# Patient Record
Sex: FEMALE | Race: WHITE | NOT HISPANIC OR LATINO | URBAN - METROPOLITAN AREA
[De-identification: names, ages, dates, MRNs, and addresses within clinical notes are randomized per-mention and may not be internally consistent; named-entity substitution may affect disease eponyms.]

---

## 2017-12-29 ENCOUNTER — OUTPATIENT (OUTPATIENT)
Dept: OUTPATIENT SERVICES | Facility: HOSPITAL | Age: 42
LOS: 1 days | Discharge: HOME | End: 2017-12-29

## 2017-12-29 DIAGNOSIS — E04.1 NONTOXIC SINGLE THYROID NODULE: ICD-10-CM

## 2018-01-18 ENCOUNTER — APPOINTMENT (OUTPATIENT)
Dept: OTOLARYNGOLOGY | Facility: CLINIC | Age: 43
End: 2018-01-18

## 2018-01-18 ENCOUNTER — OTHER (OUTPATIENT)
Age: 43
End: 2018-01-18

## 2018-01-18 PROBLEM — Z00.00 ENCOUNTER FOR PREVENTIVE HEALTH EXAMINATION: Status: ACTIVE | Noted: 2018-01-18

## 2018-02-26 ENCOUNTER — OTHER (OUTPATIENT)
Age: 43
End: 2018-02-26

## 2018-07-11 ENCOUNTER — OTHER (OUTPATIENT)
Age: 43
End: 2018-07-11

## 2018-08-14 ENCOUNTER — OUTPATIENT (OUTPATIENT)
Dept: OUTPATIENT SERVICES | Facility: HOSPITAL | Age: 43
LOS: 1 days | Discharge: HOME | End: 2018-08-14

## 2018-08-14 ENCOUNTER — TRANSCRIPTION ENCOUNTER (OUTPATIENT)
Age: 43
End: 2018-08-14

## 2018-08-14 DIAGNOSIS — Z01.419 ENCOUNTER FOR GYNECOLOGICAL EXAMINATION (GENERAL) (ROUTINE) WITHOUT ABNORMAL FINDINGS: ICD-10-CM

## 2018-11-01 ENCOUNTER — OTHER (OUTPATIENT)
Age: 43
End: 2018-11-01

## 2019-03-29 ENCOUNTER — OTHER (OUTPATIENT)
Age: 44
End: 2019-03-29

## 2019-05-14 ENCOUNTER — OTHER (OUTPATIENT)
Age: 44
End: 2019-05-14

## 2019-10-23 ENCOUNTER — OTHER (OUTPATIENT)
Age: 44
End: 2019-10-23

## 2019-11-18 ENCOUNTER — OUTPATIENT (OUTPATIENT)
Dept: OUTPATIENT SERVICES | Facility: HOSPITAL | Age: 44
LOS: 1 days | Discharge: HOME | End: 2019-11-18

## 2019-11-18 ENCOUNTER — RESULT REVIEW (OUTPATIENT)
Age: 44
End: 2019-11-18

## 2019-11-20 DIAGNOSIS — Z12.4 ENCOUNTER FOR SCREENING FOR MALIGNANT NEOPLASM OF CERVIX: ICD-10-CM

## 2019-12-20 ENCOUNTER — OTHER (OUTPATIENT)
Age: 44
End: 2019-12-20

## 2019-12-27 ENCOUNTER — OUTPATIENT (OUTPATIENT)
Dept: OUTPATIENT SERVICES | Facility: HOSPITAL | Age: 44
LOS: 1 days | Discharge: HOME | End: 2019-12-27
Payer: MEDICAID

## 2019-12-27 DIAGNOSIS — Z12.31 ENCOUNTER FOR SCREENING MAMMOGRAM FOR MALIGNANT NEOPLASM OF BREAST: ICD-10-CM

## 2019-12-27 PROCEDURE — 77067 SCR MAMMO BI INCL CAD: CPT | Mod: 26

## 2019-12-27 PROCEDURE — 77063 BREAST TOMOSYNTHESIS BI: CPT | Mod: 26

## 2020-01-07 ENCOUNTER — LABORATORY RESULT (OUTPATIENT)
Age: 45
End: 2020-01-07

## 2020-01-07 ENCOUNTER — APPOINTMENT (OUTPATIENT)
Dept: DERMATOLOGY | Facility: CLINIC | Age: 45
End: 2020-01-07
Payer: COMMERCIAL

## 2020-01-07 ENCOUNTER — OUTPATIENT (OUTPATIENT)
Dept: OUTPATIENT SERVICES | Facility: HOSPITAL | Age: 45
LOS: 1 days | Discharge: HOME | End: 2020-01-07

## 2020-01-07 VITALS — BODY MASS INDEX: 27.82 KG/M2 | HEIGHT: 63 IN | WEIGHT: 157 LBS

## 2020-01-07 DIAGNOSIS — Z80.8 FAMILY HISTORY OF MALIGNANT NEOPLASM OF OTHER ORGANS OR SYSTEMS: ICD-10-CM

## 2020-01-07 DIAGNOSIS — R68.89 OTHER GENERAL SYMPTOMS AND SIGNS: ICD-10-CM

## 2020-01-07 PROCEDURE — 11102 TANGNTL BX SKIN SINGLE LES: CPT

## 2020-01-07 PROCEDURE — 99201 OFFICE OUTPATIENT NEW 10 MINUTES: CPT | Mod: 57,GC

## 2020-01-07 RX ORDER — LEVOTHYROXINE SODIUM 0.1 MG/1
100 TABLET ORAL DAILY
Qty: 30 | Refills: 1 | Status: DISCONTINUED | COMMUNITY
Start: 2018-01-18 | End: 2020-01-07

## 2020-01-07 RX ORDER — THYROID, PORCINE 60 MG/1
60 TABLET ORAL DAILY
Qty: 30 | Refills: 2 | Status: DISCONTINUED | COMMUNITY
Start: 2019-03-29 | End: 2020-01-07

## 2020-01-07 RX ORDER — METRONIDAZOLE 500 MG/1
500 TABLET ORAL TWICE DAILY
Qty: 28 | Refills: 0 | Status: DISCONTINUED | COMMUNITY
Start: 2018-07-11 | End: 2020-01-07

## 2020-01-07 NOTE — HISTORY OF PRESENT ILLNESS
[de-identified] : 45 yo w/ PMH of asthma, cervical cancer s/p LEEP, hypothyroidism who presents w/ 3 month hx of papule on nose who\par states she felt the lump and tried to pop it though it did not have any drainage. It had some mild bleeding and states "it healed\par funny." She comes to the clinic concerned about the papule due to hx of basal cell cancer in her father and melanoma in her mother.\par \par Denies pain, itching, discharge, fevers, chills, and weight loss.\par No precipitating factors, no alleviating factors.\par She tried bacitracin on the papule though it did not help.\par Denies makeup use and recent antibiotic use.

## 2020-01-07 NOTE — ASSESSMENT
[FreeTextEntry1] : 45 yo w/ PMH of asthma, cervical cancer s/p LEEP, hypothyroidism who presents w/ 3 month hx of papule on nose.\par \par # Hypothyroidism\par - C/w levothyroxine\par \par # Asthma\par - C/w ventolin PRN\par \par # Papule on R side of nose\par - Hx of basal cell cancer in father and melanoma in mother\par - Will perform superficial skin biopsy of nose under local anesthesia\par - Possibly angiofibroma\par - Cannot r/o basal cell carcinoma\par - F/u next week for pathology results\par \par

## 2020-01-07 NOTE — PHYSICAL EXAM
[Alert] : alert [Oriented x 3] : ~L oriented x 3 [Hair] : Hair [No Visual Lymphadenopathy] : no visual  lymphadenopathy [Scalp] : Scalp [Face] : Face [Eyelids] : Eyelids [Ears] : Ears [Lips] : Lips [Neck] : Neck [R Arm] : R Arm [L Arm] : L Arm [Nodes] : Nodes [FreeTextEntry3] : ~2mm papule on right side of nose, flesh-colored

## 2020-01-07 NOTE — REVIEW OF SYSTEMS
[Headache] : headache [Fatigue] : no fatigue [Fever] : no fever [Weight loss] : no weight loss [Night Sweats] : no night sweats [Stiffness] : no stiffness [Weakness] : no weakness [Vomiting] : no vomiting [Nausea] : no nausea [Cough] : no cough [Shortness Of Breath] : no shortness of breath [Dysuria] : no dysuria [Chest Pain] : no chest pain

## 2020-01-07 NOTE — END OF VISIT
[FreeTextEntry2] : I performed biopsy.\par Small fragments of superficial skin obtained.\par It is possible that pathology will show only superficial epidermis, but there was no indication to do a deeper biopsy. [] : Resident

## 2020-01-08 ENCOUNTER — TRANSCRIPTION ENCOUNTER (OUTPATIENT)
Age: 45
End: 2020-01-08

## 2020-01-14 ENCOUNTER — APPOINTMENT (OUTPATIENT)
Dept: DERMATOLOGY | Facility: CLINIC | Age: 45
End: 2020-01-14

## 2020-04-25 ENCOUNTER — MESSAGE (OUTPATIENT)
Age: 45
End: 2020-04-25

## 2020-05-01 ENCOUNTER — APPOINTMENT (OUTPATIENT)
Dept: DISASTER EMERGENCY | Facility: HOSPITAL | Age: 45
End: 2020-05-01

## 2020-05-02 LAB
SARS-COV-2 IGG SERPL IA-ACNC: <0.1 INDEX
SARS-COV-2 IGG SERPL QL IA: NEGATIVE

## 2020-05-18 ENCOUNTER — RX RENEWAL (OUTPATIENT)
Age: 45
End: 2020-05-18

## 2020-05-18 RX ORDER — LEVOTHYROXINE SODIUM 100 UG/1
100 TABLET ORAL
Qty: 30 | Refills: 4 | Status: ACTIVE | COMMUNITY
Start: 2019-12-20 | End: 1900-01-01

## 2020-08-04 ENCOUNTER — EMERGENCY (EMERGENCY)
Facility: HOSPITAL | Age: 45
LOS: 0 days | Discharge: HOME | End: 2020-08-04
Attending: EMERGENCY MEDICINE | Admitting: EMERGENCY MEDICINE
Payer: MEDICAID

## 2020-08-04 VITALS
SYSTOLIC BLOOD PRESSURE: 190 MMHG | RESPIRATION RATE: 17 BRPM | OXYGEN SATURATION: 100 % | TEMPERATURE: 98 F | DIASTOLIC BLOOD PRESSURE: 96 MMHG | HEART RATE: 60 BPM

## 2020-08-04 DIAGNOSIS — R06.02 SHORTNESS OF BREATH: ICD-10-CM

## 2020-08-04 PROCEDURE — L9991: CPT

## 2020-08-04 NOTE — ED ADULT TRIAGE NOTE - CHIEF COMPLAINT QUOTE
Sob x 1 day, was sent in from work for evaluation, no acute distress at this time, no wheezing noted, hx of asthma, took albuterol pump this morning.

## 2020-12-16 ENCOUNTER — RX RENEWAL (OUTPATIENT)
Age: 45
End: 2020-12-16

## 2021-10-26 ENCOUNTER — OUTPATIENT (OUTPATIENT)
Dept: OUTPATIENT SERVICES | Facility: HOSPITAL | Age: 46
LOS: 1 days | Discharge: HOME | End: 2021-10-26

## 2021-10-26 ENCOUNTER — APPOINTMENT (OUTPATIENT)
Dept: DERMATOLOGY | Facility: CLINIC | Age: 46
End: 2021-10-26
Payer: COMMERCIAL

## 2021-10-26 DIAGNOSIS — R23.8 OTHER SKIN CHANGES: ICD-10-CM

## 2021-10-26 PROCEDURE — 99213 OFFICE O/P EST LOW 20 MIN: CPT | Mod: GC

## 2021-11-30 ENCOUNTER — APPOINTMENT (OUTPATIENT)
Dept: DERMATOLOGY | Facility: CLINIC | Age: 46
End: 2021-11-30

## 2021-12-23 RX ORDER — ALBUTEROL SULFATE 90 UG/1
108 (90 BASE) AEROSOL, METERED RESPIRATORY (INHALATION)
Qty: 1 | Refills: 4 | Status: ACTIVE | COMMUNITY
Start: 2018-02-26 | End: 1900-01-01

## 2022-02-09 ENCOUNTER — APPOINTMENT (OUTPATIENT)
Dept: PLASTIC SURGERY | Facility: CLINIC | Age: 47
End: 2022-02-09
Payer: COMMERCIAL

## 2022-02-09 VITALS — WEIGHT: 180 LBS | BODY MASS INDEX: 31.89 KG/M2 | HEIGHT: 63 IN

## 2022-02-09 DIAGNOSIS — Z86.018 PERSONAL HISTORY OF OTHER BENIGN NEOPLASM: ICD-10-CM

## 2022-02-09 DIAGNOSIS — D48.7 NEOPLASM OF UNCERTAIN BEHAVIOR OF OTHER SPECIFIED SITES: ICD-10-CM

## 2022-02-09 DIAGNOSIS — D48.5 NEOPLASM OF UNCERTAIN BEHAVIOR OF SKIN: ICD-10-CM

## 2022-02-09 DIAGNOSIS — Z78.9 OTHER SPECIFIED HEALTH STATUS: ICD-10-CM

## 2022-02-09 DIAGNOSIS — Z87.19 PERSONAL HISTORY OF OTHER DISEASES OF THE DIGESTIVE SYSTEM: ICD-10-CM

## 2022-02-09 PROCEDURE — 99202 OFFICE O/P NEW SF 15 MIN: CPT | Mod: 25

## 2022-02-09 PROCEDURE — 11102 TANGNTL BX SKIN SINGLE LES: CPT

## 2022-02-09 NOTE — PROCEDURE
[Nl] : None [FreeTextEntry1] : right nasal nonpigmentation skin lesion [FreeTextEntry2] : right nasal nonpigmentation skin lesion hsave biopsy [FreeTextEntry6] : Benefits, risks and alternatives of the procedure were discussed. The risks include but not limited to bleeding, infection, poor wound healing and scarring, possible keloid, and need for re-operation. Location of scar and expected post-surgical outcomes were discussed. The patient understands the risks and would like to proceed with the office surgery.  Informed consent obtained\par \par The skin lesion was marked and infiltrated with local anesthesia to effect.  The excision site was prepared and draped in sterile fashion.\par The skin lesion was excised with the indicated margins in the usual fashion and sent to pathology for review. \par Surgical wounds were made hemostatic and repaired as follows:\par \par Site: right nasal dorsum\par Skin lesion width (with margins): shave 3 mm\par Moncel's and bacitracin/bandage applied\par  [FreeTextEntry7] : right nasal skin lesion

## 2022-02-09 NOTE — ASSESSMENT
[FreeTextEntry1] : 47 yo F with right nasal skin lesion indicated for biopsy to establish diagnosis, likely benign\par \par s/p shave biopsy\par \par -pt tolerated the procedure\par -bacitracin BID\par -sun protection\par -all questions were answered\par -follow up in 2 weeks to review path and treatment plan\par \par Due to COVID-19, pre-visit patient instructions were explained to the patient and their symptoms were checked upon arrival. Masks were used by the healthcare provider and staff and the examination room was cleaned after the patient visit concluded\par

## 2022-02-09 NOTE — PHYSICAL EXAM
[de-identified] : well developed female, NAD [de-identified] : NC/AT [de-identified] : PERRL [de-identified] : supple [de-identified] : unlabored breathing, good inspiratory effort  [de-identified] : HONEYR [de-identified] : soft, nontender  [de-identified] : FROM in all 4 extremities  [de-identified] : Face - right nasal tip with 3mm round raised nodular mass, nontender; no pigmentation, keratosis or malignant characteristics

## 2022-02-09 NOTE — HISTORY OF PRESENT ILLNESS
[FreeTextEntry1] : 45 yo F with PMHx of Asthma, Lyme disease, GERD, Hypothyroidism and Cervical Ca s/p LEEP who presents today for evaluation of right nasal skin lesion. Patient reports having a small raised skin lesion for 2-3 years s/p office shave biopsy with Dr. Rasheed with unfortunate loss of specimen. \par Patient now presents c/o increasing size of the lesion. Denies any pain, pruritus or spontaneous bleeding. 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

## 2022-02-23 LAB — CORE LAB BIOPSY: NORMAL

## 2022-02-25 ENCOUNTER — APPOINTMENT (OUTPATIENT)
Dept: PLASTIC SURGERY | Facility: CLINIC | Age: 47
End: 2022-02-25

## 2022-04-14 RX ORDER — BUDESONIDE AND FORMOTEROL FUMARATE DIHYDRATE 160; 4.5 UG/1; UG/1
160-4.5 AEROSOL RESPIRATORY (INHALATION) TWICE DAILY
Qty: 1 | Refills: 3 | Status: DISCONTINUED | COMMUNITY
Start: 2022-04-14 | End: 2022-04-14

## 2022-04-14 RX ORDER — BUDESONIDE AND FORMOTEROL FUMARATE DIHYDRATE 160; 4.5 UG/1; UG/1
160-4.5 AEROSOL RESPIRATORY (INHALATION) TWICE DAILY
Qty: 1 | Refills: 3 | Status: ACTIVE | COMMUNITY
Start: 2022-04-14 | End: 1900-01-01

## 2022-04-21 RX ORDER — BUDESONIDE AND FORMOTEROL FUMARATE DIHYDRATE 160; 4.5 UG/1; UG/1
160-4.5 AEROSOL RESPIRATORY (INHALATION) TWICE DAILY
Qty: 1 | Refills: 3 | Status: ACTIVE | COMMUNITY
Start: 2022-04-21 | End: 1900-01-01

## 2022-12-27 ENCOUNTER — APPOINTMENT (OUTPATIENT)
Dept: ORTHOPEDIC SURGERY | Facility: CLINIC | Age: 47
End: 2022-12-27
Payer: COMMERCIAL

## 2022-12-27 VITALS — BODY MASS INDEX: 29.23 KG/M2 | WEIGHT: 165 LBS | HEIGHT: 63 IN

## 2022-12-27 PROCEDURE — 73630 X-RAY EXAM OF FOOT: CPT | Mod: LT

## 2022-12-27 PROCEDURE — 99203 OFFICE O/P NEW LOW 30 MIN: CPT

## 2022-12-27 NOTE — IMAGING
[de-identified] : Examination of the left foot, patient has generalized swelling, bruising on about the dorsal lateral aspect of the foot, patient has significant tenderness over the Lisfranc and over the navicular bone.\par Decreased range of motion to dorsiflexion plantarflexion possibly due to pain and swelling.\par Pain with ranging of the toes.\par Decreased motor strength with dorsiflexion plantarflexion possibly due to pain.\par Unable to apply weight.\par Neurovascular intact.\par \par X-ray of the left foot was signed in office today, this x-ray was nonweightbearing, there is a questionable fracture over the navicular, mildly displaced.\par

## 2022-12-27 NOTE — HISTORY OF PRESENT ILLNESS
[de-identified] : 47-year-old female here for evaluation of injury sustained to the left foot, patient states that this new happened yesterday, patient states that she was going down the stairs, she tripped and fell forward dorsiflexion injury to the left foot, at this time patient has significant swelling, pain and unable to apply weight over the foot.

## 2022-12-27 NOTE — DISCUSSION/SUMMARY
[de-identified] : Impression: Injury to the left foot, possible navicular fracture and Lisfranc injury.\par \par Plan: Patient was placed in a short cam walker boot, advised of nonweightbearing, patient was advised for the use of crutches or a knee scooter.\par Patient was advised for an MRI of the left foot to evaluate a possible Lisfranc injury and evaluate navicular fracture.\par Patient was advised to stay home from work.\par Patient was advised to take over-the-counter anti-inflammatories for pain.\par Patient was advised to follow-up in 2 weeks for repeat evaluation with Dr. Goss or Dr. Miramontes.\par Patient was advised to give me a call us once the MRI gets done so we can discuss results over the phone.\par \par Follow-up: 2 weeks.\par \par Supervising physician Dr. Miramontes

## 2023-01-03 ENCOUNTER — APPOINTMENT (OUTPATIENT)
Dept: MRI IMAGING | Facility: CLINIC | Age: 48
End: 2023-01-03
Payer: COMMERCIAL

## 2023-01-03 PROCEDURE — 73718 MRI LOWER EXTREMITY W/O DYE: CPT | Mod: LT

## 2023-01-06 ENCOUNTER — NON-APPOINTMENT (OUTPATIENT)
Age: 48
End: 2023-01-06

## 2023-01-13 ENCOUNTER — APPOINTMENT (OUTPATIENT)
Dept: ORTHOPEDIC SURGERY | Facility: CLINIC | Age: 48
End: 2023-01-13
Payer: COMMERCIAL

## 2023-01-13 ENCOUNTER — NON-APPOINTMENT (OUTPATIENT)
Age: 48
End: 2023-01-13

## 2023-01-13 PROCEDURE — 99213 OFFICE O/P EST LOW 20 MIN: CPT

## 2023-01-13 NOTE — ASSESSMENT
[FreeTextEntry1] : 47-year-old woman status post axial loading injury left foot resulting in a Lisfranc sprain.  Recommend 6-12 weeks nonweightbearing.  Recommend nonsteroidal anti-inflammatory medication or Tylenol for pain relief.  NSAID precautions discussed.  Will give her prescription for narrow walker to utilize within her house to navigate her house.  Will have her check in with us in 6 weeks for repeat evaluation.  Patient remains 100% disabled at a [address] to work.  Will reassess her on follow-up.

## 2023-01-13 NOTE — HISTORY OF PRESENT ILLNESS
[de-identified] :  47-year-old woman sustained a axial loading injury to her left foot slipping on stairs December 26, 2022. Initially seen in our walk-in clinic radiographs were on impressive for fracture.  Subsequently sent for an MRI the report of which is in records.  Patient was placed in a Cam boot instructed to remain nonweightbearing and given crutches.  She returns today for follow-up.  She has no prior history of foot problems.  She has chosen to live with the pain and take medication.  She has no history of fractures.  Past medical history significant for hypothyroidism.  She has pre menopausal.

## 2023-01-13 NOTE — IMAGING
[de-identified] :   Pleasant young woman sits comfortably my office no distress.\par \par Physical examination:\par Left foot and ankle:  Tenderness palpation primarily over the middle and lateral aspect of her metatarsal tarsal joints as well as tenderness along the ATFL ligament of her ankle and around the peroneal tendons of her hindfoot.  There is ecchymosis that is resolving dorsal lateral aspect of her midfoot.  Arch is adequately maintained.  Normal light sensation dorsal plantar aspect her foot.  Good capillary refill.  She is able range her ankle without undue discomfort over short arc.  There are no callosities or ulcers.\par \par Radiographs of the patient's left foot from December 27, 2022 were Re reviewed and demonstrate no evidence of fracture.  The MRI from January 3, 2023 was reviewed.  I agree with the radiology report that there is edema at the bases 2nd metatarsal which is consistent with a bony Lisfranc her ankle Lisfranc sprain.  There is also some edema laterally but clinically this does not correlate discomfort.  There is tenderness along her peroneal tendons done a do see some edema about the insertion of the peroneal tendons on the lateral aspect of the midfoot.

## 2023-01-26 RX ORDER — GABAPENTIN 300 MG/1
300 CAPSULE ORAL
Qty: 90 | Refills: 2 | Status: ACTIVE | COMMUNITY
Start: 2023-01-26 | End: 1900-01-01

## 2023-02-16 ENCOUNTER — NON-APPOINTMENT (OUTPATIENT)
Age: 48
End: 2023-02-16

## 2023-02-16 ENCOUNTER — APPOINTMENT (OUTPATIENT)
Dept: ORTHOPEDIC SURGERY | Facility: CLINIC | Age: 48
End: 2023-02-16
Payer: COMMERCIAL

## 2023-02-16 PROCEDURE — 73630 X-RAY EXAM OF FOOT: CPT | Mod: LT

## 2023-02-16 PROCEDURE — 99213 OFFICE O/P EST LOW 20 MIN: CPT

## 2023-02-16 RX ORDER — NAPROXEN 500 MG/1
500 TABLET ORAL
Qty: 60 | Refills: 2 | Status: ACTIVE | COMMUNITY
Start: 2023-02-16 | End: 1900-01-01

## 2023-02-16 NOTE — IMAGING
[de-identified] :   Pleasant early middle-aged woman sits comfortably my office no distress.  She was able get onto the exam table without assistance.  She is abide by nonweightbearing precautions.  She remains in a short Cam boot.\par \par Physical examination:\par Left foot and ankle with the boot removed:  Dysesthetic sensation to light touch over the dorsal aspect of her foot.  No undue swelling.  She also has deep tenderness to palpation over the calcaneal tuberosity.  Mild discomfort to palpation over the plantar aspect of her midfoot.  Arch is maintained.  Good capillary refill.  No callosities ulcers.  Calf soft no cords.

## 2023-02-16 NOTE — HISTORY OF PRESENT ILLNESS
[de-identified] :  47-year-old woman returns for interval follow-up of a left midfoot sprain complicated by complex regional pain syndrome.  Recently begun on gabapentin, which has significantly of from improved her symptoms.  She has remain nonweightbearing  since January 6, 2022.  She remains in a short cam boot.  She also has some complaints achy pain.  She remains out of work as result of this injury.

## 2023-02-16 NOTE — ASSESSMENT
[FreeTextEntry1] :   47-year-old woman with left midfoot sprain roughly 6 weeks ago complicated by complex regional pain syndrome.  This is partially improved with commencement a gabapentin.  Would recommend continuing with a nonsteroidal anti-inflammatory medication for the achy pain.  Were going to give her prescription for Naprosyn 500 mg 1 tablet twice daily as needed.  NSAID precautions discussed.  I would like her to start physical therapy.  Physical therapy is going to focus and initially on desensitization of the patient's foot.  After we can do sensation she can begin to work on gait training balance generalized function and strengthening of her ankle and foot.  Modalities could be utilized adjunct therapy.  I will have her follow up in my office in 6-8 weeks time for repeat evaluation.  We will reassess the patient has ability to return to work on follow-up.  In the interim she remains 100% disabled and unable to work.  During that time I would like her also to check in with the pain management team for ongoing treatment of her presumptive diagnosis of complex regional pain syndrome.  Patient agrees with the plan.  All of her questions were answered to her satisfaction.

## 2023-02-17 ENCOUNTER — APPOINTMENT (OUTPATIENT)
Dept: ORTHOPEDIC SURGERY | Facility: CLINIC | Age: 48
End: 2023-02-17

## 2023-02-24 ENCOUNTER — APPOINTMENT (OUTPATIENT)
Dept: ORTHOPEDIC SURGERY | Facility: CLINIC | Age: 48
End: 2023-02-24

## 2023-03-08 ENCOUNTER — APPOINTMENT (OUTPATIENT)
Dept: PAIN MANAGEMENT | Facility: CLINIC | Age: 48
End: 2023-03-08
Payer: COMMERCIAL

## 2023-03-08 VITALS — HEIGHT: 63 IN | BODY MASS INDEX: 31.89 KG/M2 | WEIGHT: 180 LBS

## 2023-03-08 DIAGNOSIS — Z82.49 FAMILY HISTORY OF ISCHEMIC HEART DISEASE AND OTHER DISEASES OF THE CIRCULATORY SYSTEM: ICD-10-CM

## 2023-03-08 PROCEDURE — 99204 OFFICE O/P NEW MOD 45 MIN: CPT

## 2023-03-08 NOTE — DATA REVIEWED
[MRI] : MRI [Left] : left [Foot] : foot [Report was reviewed and noted in the chart] : The report was reviewed and noted in the chart [I independently reviewed and interpreted images and report] : I independently reviewed and interpreted images and report [FreeTextEntry1] : MRI left foot (1/3/2023)\par FINDINGS: \par There is no marrow edema in the navicular bone. There is marrow edema in the anterior process \par of the calcaneus measuring 1.5 cm where a nondisplaced fracture is suspected. There is a small \par nondisplaced fracture in the plantar cuboid. There is a contusion in the plantar medial aspect of \par the medial cuneiform measuring 1 cm. There is a contusion measuring 1 cm in the dorsal \par proximal lateral cuneiform. There is a subtle contusion in the plantar base of the second \par metatarsal where there is a mild Lisfranc ligament sprain. There are tiny subchondral cysts in \par the plantar base of the first metatarsal and lateral aspect of the proximal medial cuneiform base. \par There is no malalignment. There is moderate arthrosis in the first MTP which appears chronic.\par There is no tendon tear. There is slight patchy marrow edema in the lateral distal talus \par suggesting slight contusion or stress reaction.\par \par IMPRESSION:\par 1. Nondisplaced fracture of the anterior process of the calcaneus and small nondisplaced fracture \par in the plantar distal cuboid with contusions in the medial cuneiform and lateral cuneiform.\par 2. Mild Lisfranc ligament sprain and subtle patchy marrow edema in the plantar base of the \par second metatarsal which could represent stress reaction, contusion, or small nondisplaced \par avulsion injury without Lisfranc ligament discontinuity or malalignment of the midfoot.\par 3. Slight patchy marrow edema in the lateral distal talus suggesting slight contusion or stress \par reaction.\par 4. Small subcortical cysts in the medial cuneiform and base of the first metatarsal.\par 5. Moderate chronic appearing arthrosis of the first\par MTP. \par 6. No marrow edema in the navicular bone.

## 2023-03-08 NOTE — HISTORY OF PRESENT ILLNESS
[FreeTextEntry1] : Patient is a 46 y/o woman presenting for a NPV for a history of left foot pain. She states that she stepped wrong on her left foot when walking down the stairs to her basement on 12/26/2022. She felt a pop over the dorsolateral aspect of the left foot and soon thereafter developed some pain and erythema/bruising over this region. She went to see the orthopedist the next day and underwent x-rays and then an MRI of the left foot. She did not follow initial instructions to be non weightbearing, but after a few days, she received a phone call from the office with MRI results and then complied with using a walking boot. At this point, the patient was mild and she did not require any medications for pain. However, three weeks after the initial injury, the patient suddenly developed a burning, tingling, stabbing pain over the right great toe and medial foot. She had pins and needles sensation, allodynia, and erythema. The patient called the orthopedist, who prescribed her gabapentin and told her she may have CRPS. Two days after starting gabapentin, she noticed significant improvement in pain. She then started in physical therapy and has been doing desensitization treatment there and at home and notes improvement of her symptoms. Currently, the patient endorses having allodynia over the dorsal aspect of the toes and foot. In the 1st and 3rd toes, the patient has intermittent blue discoloration. She has also had peeling, sloughing skin over the sole of the left foot. No hair growth over the left great toe. She also notes very slow toenail growth over all the toes, especially the 1st and 3rd toes. The patient states that she has had to cut the toenails on the right foot twice and has not had to cut the toenails on the left. No sweating, but the patient notes some joint stiffness over the great toe.

## 2023-03-08 NOTE — REVIEW OF SYSTEMS
[Joint Stiffness] : joint stiffness [Negative] : Endocrine [Fever] : no fever [Chills] : no chills [Sleep Disturbances] : ~T no sleep disturbances

## 2023-03-08 NOTE — PHYSICAL EXAM
[de-identified] : Gen: NAD\par Head: NC/AT\par Eyes: no glasses, no scleral icterus\par ENT: patient wearing a mask\par CV: RRR, S1 S2, no mrg\par Lungs: CTAB, nonlabored breathing\par Abd: soft, NT/ND\par Ext: LLE: +skin sloughing over the plantar aspect of the foot, +allodynia over the dorsal aspect of the foot with heightened sensitivity over the 1st and 3rd toes; +blotchy erythema throughout the dorsal left foot compared to the right and shiny appearance of the skin on the left foot; stiffness over the great toe w/ decreased ROM\par Neuro: CN intact\par Psych: normal affect\par Skin: no visible lesions\par

## 2023-03-08 NOTE — DISCUSSION/SUMMARY
[de-identified] : Patient is a 46 y/o woman presenting for a NPV for a history of left foot pain after an injury in 12/2022 who developed CRPS symptoms in January 2023. \par \par Prior treatment: gabapentin 300mg TID w/ improvement, physical therapy x1-2 months\par \par Plan:\par 1) Patient meets [place] Criteria for CRPS type I\par 2) Continue gabapentin 300mg TID\par 3) Continue physical therapy with focus on desensitization\par 4) Discussed a trial of LEFT lumbar sympathetic blocks; will defer at this time, as symptoms are improving\par 5) RTC 6 weeks

## 2023-03-30 ENCOUNTER — APPOINTMENT (OUTPATIENT)
Dept: ORTHOPEDIC SURGERY | Facility: CLINIC | Age: 48
End: 2023-03-30
Payer: COMMERCIAL

## 2023-03-30 PROCEDURE — 99213 OFFICE O/P EST LOW 20 MIN: CPT

## 2023-03-30 NOTE — ASSESSMENT
[FreeTextEntry1] : 48-year-old woman midfoot sprain and ankle sprain complicated by complex regional pain syndrome seems to be responding to modalities of desensitization and gabapentin.  I am going to renew her prescription for gabapentin for the next month and then going forward she will get prescriptions from Pain Management.  Were going to renew her physical therapy which will continue with the desensitization but can now work on ankle and toe range of motion ankle strengthening exercises gait training balance and generalized conditioning.  She can weight bear as tolerated.  I would recommend starting weight-bearing in the Cam boot and then as she becomes more facile transitioning to supportive shoe wear with a rigid sole.  I would have her check in with me in 4-6 weeks time for repeat evaluation with repeat weight-bearing radiographs of her foot.  She has any problems she is welcome back sooner.  All questions were answered to her satisfaction and she agrees with the plan.  Patient is going to call the office when she is ready to return to work otherwise will anticipate that she is out of work until follow-up.

## 2023-03-30 NOTE — IMAGING
[de-identified] : Daina young woman sits comfortably in my office in no distress.\par \par Physical examination:\par Left foot ankle:  Sensitivity to light touch is significantly improved.  There is no swelling about her foot.  Arch is maintained in midfoot remains relatively supple.  She has no focal bony tenderness about her midfoot her ankle.  Some mild tenderness along the dorsal aspect of her midfoot as well as along the ATFL ligaments.  Calf is soft no cords.  She is able actively dorsiflex and plantar flex over short arc without discomfort.  She still remains with difficulties inverting any vertigo her foot.  Good capillary refill.\par \par

## 2023-03-30 NOTE — HISTORY OF PRESENT ILLNESS
[de-identified] :  48-year-old woman returns for interval follow-up of a left midfoot and probably ankle sprain sustained as result of a twisting injury January 6, 2023.  Her injuries been complicated by complex regional pain syndrome for which she has been started on desensitization therapy and gabapentin.  These modalities have significantly improved her sensitivity to discomfort.  For the most part she has abide by nonweightbearing precautions.  She is eager to advance her functional status at albeit with some trepidation because of discomfort.  Denies any new trauma.

## 2023-04-13 ENCOUNTER — NON-APPOINTMENT (OUTPATIENT)
Age: 48
End: 2023-04-13

## 2023-04-20 ENCOUNTER — APPOINTMENT (OUTPATIENT)
Dept: PAIN MANAGEMENT | Facility: CLINIC | Age: 48
End: 2023-04-20
Payer: COMMERCIAL

## 2023-04-20 VITALS
HEIGHT: 63 IN | BODY MASS INDEX: 31.89 KG/M2 | WEIGHT: 180 LBS | DIASTOLIC BLOOD PRESSURE: 104 MMHG | SYSTOLIC BLOOD PRESSURE: 141 MMHG | HEART RATE: 77 BPM

## 2023-04-20 PROCEDURE — 99214 OFFICE O/P EST MOD 30 MIN: CPT

## 2023-04-20 NOTE — HISTORY OF PRESENT ILLNESS
[FreeTextEntry1] : Patient is a 47 y/o woman presenting for a RPV for a history of CRPS type I of the left foot after an injury. The patient has started weight bearing on the LLE 3 weeks ago and notes ongoing discomfort, burning, and numbness over the left great toe and other toes and entire foot. She continues to have intermittent purple discoloration in the area as well as stiffness in the toes. The patient also endorses ongoing skin sloughing in the foot. She also continues to have allodynia over the toes, particularly the great toe. The patient continues to take gabapentin 300mg TID. She does not have significant side effects with this medication.

## 2023-04-20 NOTE — DISCUSSION/SUMMARY
[de-identified] : Patient is a 49 y/o woman presenting for a RPV for a history of CRPS type I of the left foot after an injury.\par \par Plan:\par 1) Increase gabapentin to 600mg TID\par 2) Continue physical therapy/desensitization\par 3) Consider LEFT LSB\par 4) RTC 4 weeks

## 2023-04-20 NOTE — PHYSICAL EXAM
[de-identified] : Gen: NAD\par HEENT: NC/AT, no scleral icterus\par CV: no JVD\par Resp: nonlabored breathing\par Abd: nondistended\par Ext: LLE: +allodynia over all of the toes, particularly the great toe; no temperature or color abnormality, apparent stiffness with movement of the toes, +some skin dryness and flaking throughout the foot\par Neuro: CN intact\par Psych: normal affect\par

## 2023-04-21 ENCOUNTER — OUTPATIENT (OUTPATIENT)
Dept: OUTPATIENT SERVICES | Facility: HOSPITAL | Age: 48
LOS: 1 days | End: 2023-04-21
Payer: COMMERCIAL

## 2023-04-21 DIAGNOSIS — Z12.31 ENCOUNTER FOR SCREENING MAMMOGRAM FOR MALIGNANT NEOPLASM OF BREAST: ICD-10-CM

## 2023-04-21 DIAGNOSIS — R92.2 INCONCLUSIVE MAMMOGRAM: ICD-10-CM

## 2023-04-21 PROCEDURE — 77063 BREAST TOMOSYNTHESIS BI: CPT | Mod: 26

## 2023-04-21 PROCEDURE — 77063 BREAST TOMOSYNTHESIS BI: CPT

## 2023-04-21 PROCEDURE — 77067 SCR MAMMO BI INCL CAD: CPT

## 2023-04-21 PROCEDURE — 76641 ULTRASOUND BREAST COMPLETE: CPT | Mod: 26,50

## 2023-04-21 PROCEDURE — 77067 SCR MAMMO BI INCL CAD: CPT | Mod: 26

## 2023-04-21 PROCEDURE — 76641 ULTRASOUND BREAST COMPLETE: CPT | Mod: 50

## 2023-04-22 DIAGNOSIS — Z12.31 ENCOUNTER FOR SCREENING MAMMOGRAM FOR MALIGNANT NEOPLASM OF BREAST: ICD-10-CM

## 2023-04-22 DIAGNOSIS — R92.2 INCONCLUSIVE MAMMOGRAM: ICD-10-CM

## 2023-04-28 ENCOUNTER — APPOINTMENT (OUTPATIENT)
Dept: GASTROENTEROLOGY | Facility: CLINIC | Age: 48
End: 2023-04-28
Payer: COMMERCIAL

## 2023-04-28 DIAGNOSIS — Z86.39 PERSONAL HISTORY OF OTHER ENDOCRINE, NUTRITIONAL AND METABOLIC DISEASE: ICD-10-CM

## 2023-04-28 DIAGNOSIS — Z12.11 ENCOUNTER FOR SCREENING FOR MALIGNANT NEOPLASM OF COLON: ICD-10-CM

## 2023-04-28 DIAGNOSIS — Z87.09 PERSONAL HISTORY OF OTHER DISEASES OF THE RESPIRATORY SYSTEM: ICD-10-CM

## 2023-04-28 PROCEDURE — 99212 OFFICE O/P EST SF 10 MIN: CPT | Mod: 95

## 2023-04-28 RX ORDER — POLYETHYLENE GLYCOL 3350 AND ELECTROLYTES WITH LEMON FLAVOR 236; 22.74; 6.74; 5.86; 2.97 G/4L; G/4L; G/4L; G/4L; G/4L
236 POWDER, FOR SOLUTION ORAL
Qty: 1 | Refills: 0 | Status: ACTIVE | COMMUNITY
Start: 2023-04-28 | End: 1900-01-01

## 2023-04-28 RX ORDER — OMEPRAZOLE 40 MG/1
40 CAPSULE, DELAYED RELEASE ORAL
Qty: 30 | Refills: 2 | Status: DISCONTINUED | COMMUNITY
Start: 2021-11-15 | End: 2023-04-28

## 2023-04-28 RX ORDER — BISACODYL 5 MG/1
5 TABLET ORAL
Qty: 4 | Refills: 0 | Status: ACTIVE | COMMUNITY
Start: 2023-04-28 | End: 1900-01-01

## 2023-04-28 NOTE — REVIEW OF SYSTEMS
[Fever] : no fever [Chills] : no chills [Feeling Tired] : not feeling tired [Recent Weight Loss (___ Lbs)] : no recent weight loss [Chest Pain] : no chest pain [Palpitations] : no palpitations [Lower Ext Edema (lower leg swelling)] : no lower extremity edema [Cough] : no cough [SOB on Exertion] : no shortness of breath during exertion [Abdominal Pain] : no abdominal pain [Vomiting] : no vomiting [Constipation] : no constipation [Diarrhea] : no diarrhea [Heartburn] : no heartburn [Melena (black stool)] : no melena [FreeTextEntry7] : occasional rectal bleeding

## 2023-04-28 NOTE — ASSESSMENT
[FreeTextEntry1] : 47yo female h/o asthma, hypothyroidism presents for evaluation for initial screening colonoscopy. Occasional rectal bleeding otherwise no GI complaints.\par \par -Recommend obtain outside labs from PMD office\par -Recommend colonoscopy to further evaluate and as pt due for screening purposes\par -Discussed risks/benefits with pt in detail including but not limited to bleeding, infection, perforation, side effects of medications/sedation. Pt verbalized understanding and wants to proceed with colonoscopy.\par \par \par Follow up after testing\par Pt agreeable with plan, advised to contact us if questions/concerns in the interim\par

## 2023-04-28 NOTE — HISTORY OF PRESENT ILLNESS
[FreeTextEntry4] : KRIS CLEARY [FreeTextEntry1] : 47yo female h/o asthma, hypothyroidism presents for evaluation for initial screening colonoscopy\par \par Pt reports feeling well overall. Denies abdominal pain or nausea/vomiting. Reported episodes of heartburn few years ago thinks she was heavier weight at that time, took omeprazole briefly then stopped. Denies recent heartburn or dysphagia. Reports regular formed bm daily, occasional loose stools when stressed, reports occasional bright blood dripping in toilet and when wiping, denies melena. Appetite good, denies weight loss.\par States asthma is well controlled, denies prior intubation or problems with anesthesia.\par \par No prior colonoscopy\par No known family h/o colon ca\par \par No recent labs available for review, states she had done 1 month ago at PMD office \par

## 2023-05-05 ENCOUNTER — APPOINTMENT (OUTPATIENT)
Dept: ORTHOPEDIC SURGERY | Facility: CLINIC | Age: 48
End: 2023-05-05
Payer: COMMERCIAL

## 2023-05-05 PROCEDURE — 99213 OFFICE O/P EST LOW 20 MIN: CPT

## 2023-05-05 PROCEDURE — 73630 X-RAY EXAM OF FOOT: CPT | Mod: LT

## 2023-05-05 NOTE — ASSESSMENT
[FreeTextEntry1] :   48-year-old woman left midfoot sprain complicated by regional pain syndrome.  Defer to pain management regarding the CRPS.  Agree with continued use a gabapentin.  Recommend NSAID.  Will give a prescription for meloxicam.  NSAID precautions discussed.  Continue with PT.  PT to continue to work on desensitization ankle motion and strengthening with modalities utilized adjunct therapy.  Focus of therapy to teach the patient a self-directed exercise program.  Follow up in my office in 3-6 months.  Worsening symptoms of pain or problems welcome back sooner.  All questions answered to her satisfaction.Follow-up if she still having midfoot pain will consider bilateral weight-bearing foot x-rays.

## 2023-05-05 NOTE — IMAGING
[de-identified] :   Pleasant middle-aged woman sits comfortably my office no distress.  She walks in with some antalgia.\par \par Physical examination:\par Left foot:  Arch is maintained.  No undue swelling.  Dysesthetic sensation over the dorsal and to a lesser extent plantar aspect of her forefoot and lateral midfoot.  Really has no tenderness palpation over the medial metatarsal tarsal joints.  She is able range her ankle and toes without significant limitation.  There is no undue swelling about her foot.  There is no erythema or shiny discoloration to her foot consistent with a hot phase is complex regional pain syndrome.  good capillary refill with 2+ DP pulses.\par \par   Radiographs: \par Bilateral weight-bearing feet (AP, lateral, oblique):  Really no evidence of a Lisfranc disruption.  There is slight flattening of navicular cuneiform joints on the lateral radiograph.  Otherwise unremarkable examination.

## 2023-05-05 NOTE — HISTORY OF PRESENT ILLNESS
[de-identified] :  48-year-old woman returns interval follow-up left midfoot sprain sustained as result twisting injury during 06/2023.  Her recovery has been complicated by complex regional pain syndrome for which are refer to pain management.  She is currently on gabapentin 300 mg 3 times a day doubling the evening dose.  This is helped significantly with her discomfort.  She is currently enrolled in physical therapy.  She does have some achy pain from time to time mostly dysesthetic in nature.  Finds massaging lateral aspect her foot helpful.  Mostly the achy pain is around her intermetatarsal ligaments and distal metatarsophalangeal joints.  She does not really localize the achy pain to her medial midfoot.  She denies any callosities or ulcers.  She has been not taking any other medications for discomfort such as NSAIDs or Tylenol.

## 2023-05-17 ENCOUNTER — APPOINTMENT (OUTPATIENT)
Dept: PAIN MANAGEMENT | Facility: CLINIC | Age: 48
End: 2023-05-17
Payer: COMMERCIAL

## 2023-05-17 VITALS — WEIGHT: 180 LBS | BODY MASS INDEX: 31.89 KG/M2 | HEIGHT: 63 IN

## 2023-05-17 PROCEDURE — 99214 OFFICE O/P EST MOD 30 MIN: CPT

## 2023-05-17 NOTE — HISTORY OF PRESENT ILLNESS
[FreeTextEntry1] : Patient is a 47 y/o woman presenting for a RPV for a history of CRPS type I in the left foot. The patient states that, since her last visit, she has had improvement of pain. She has increased her gabapentin to 300/600/600mg daily and feels like this has kept her pain well controlled. She has been able to return to work and is able to wear a normal shoe on the left foot. The patient has continued with physical therapy and has almost completed a full 8 week course. She no longer feels tingling/numbness/burning that she had been having in the right foot previously.

## 2023-05-17 NOTE — DISCUSSION/SUMMARY
[de-identified] : Patient is a 47 y/o woman presenting for a RPV for a history of CRPS type I of the left foot after an injury.\par \par Plan:\par 1) Continue gabapentin 300/600/600mg daily\par 2) Patient may stop physical therapy; can restart in the future if needed\par 3) Consider LEFT LSB\par 4) RTC 6 weeks with Joan

## 2023-05-17 NOTE — PHYSICAL EXAM
[de-identified] : Gen: NAD\par HEENT: NC/AT, no scleral icterus\par CV: no JVD\par Resp: nonlabored breathing\par Abd: nondistended\par Ext: LLE: +allodynia over all of the toes, particularly the great toe; no temperature or color abnormality, apparent stiffness with movement of the toes, +some skin dryness and flaking throughout the foot\par Neuro: CN intact\par Psych: normal affect

## 2023-06-27 ENCOUNTER — TRANSCRIPTION ENCOUNTER (OUTPATIENT)
Age: 48
End: 2023-06-27

## 2023-06-27 ENCOUNTER — RESULT REVIEW (OUTPATIENT)
Age: 48
End: 2023-06-27

## 2023-06-27 ENCOUNTER — OUTPATIENT (OUTPATIENT)
Dept: INPATIENT UNIT | Facility: HOSPITAL | Age: 48
LOS: 1 days | Discharge: HOME | End: 2023-06-27
Payer: COMMERCIAL

## 2023-06-27 VITALS
SYSTOLIC BLOOD PRESSURE: 137 MMHG | OXYGEN SATURATION: 100 % | HEART RATE: 78 BPM | RESPIRATION RATE: 18 BRPM | DIASTOLIC BLOOD PRESSURE: 86 MMHG

## 2023-06-27 VITALS
HEART RATE: 80 BPM | TEMPERATURE: 98 F | RESPIRATION RATE: 18 BRPM | HEIGHT: 63 IN | SYSTOLIC BLOOD PRESSURE: 143 MMHG | DIASTOLIC BLOOD PRESSURE: 84 MMHG | WEIGHT: 175.05 LBS

## 2023-06-27 DIAGNOSIS — Z12.11 ENCOUNTER FOR SCREENING FOR MALIGNANT NEOPLASM OF COLON: ICD-10-CM

## 2023-06-27 DIAGNOSIS — Z98.890 OTHER SPECIFIED POSTPROCEDURAL STATES: Chronic | ICD-10-CM

## 2023-06-27 DIAGNOSIS — Z40.03 ENCOUNTER FOR PROPHYLACTIC REMOVAL OF FALLOPIAN TUBE(S): Chronic | ICD-10-CM

## 2023-06-27 PROCEDURE — 81025 URINE PREGNANCY TEST: CPT

## 2023-06-27 PROCEDURE — 45378 DIAGNOSTIC COLONOSCOPY: CPT

## 2023-06-27 PROCEDURE — 88305 TISSUE EXAM BY PATHOLOGIST: CPT

## 2023-06-27 PROCEDURE — 88305 TISSUE EXAM BY PATHOLOGIST: CPT | Mod: 26

## 2023-06-27 RX ORDER — BUDESONIDE AND FORMOTEROL FUMARATE DIHYDRATE 160; 4.5 UG/1; UG/1
2 AEROSOL RESPIRATORY (INHALATION)
Refills: 0 | DISCHARGE

## 2023-06-27 RX ORDER — GABAPENTIN 400 MG/1
1 CAPSULE ORAL
Refills: 0 | DISCHARGE

## 2023-06-27 RX ORDER — LEVOTHYROXINE SODIUM 125 MCG
1 TABLET ORAL
Refills: 0 | DISCHARGE

## 2023-06-27 NOTE — ASU PATIENT PROFILE, ADULT - NSICDXPASTSURGICALHX_GEN_ALL_CORE_FT
PAST SURGICAL HISTORY:  Encounter for prophylactic removal of fallopian tube     H/O rhinoplasty     History of excision of dermoid cyst

## 2023-06-27 NOTE — CHART NOTE - NSCHARTNOTEFT_GEN_A_CORE
PACU ANESTHESIA ADMISSION NOTE      Procedure:   Post op diagnosis:      ____  Intubated  TV:______       Rate: ______      FiO2: ______    _x___  Patent Airway    _x___  Full return of protective reflexes    ____  Full recovery from anesthesia / back to baseline status    Vitals:P 72 R 16 T 97.2 /76 SpO2 100%  T(C): 36.6 (06-27-23 @ 09:19), Max: 36.6 (06-27-23 @ 09:19)  HR: 80 (06-27-23 @ 09:19) (80 - 80)  BP: 143/84 (06-27-23 @ 09:19) (143/84 - 143/84)  RR: 18 (06-27-23 @ 09:19) (18 - 18)  SpO2: --    Mental Status:  __x__ Awake   _____ Alert   _____ Drowsy   _____ Sedated    Nausea/Vomiting:  _x___  NO       ______Yes,   See Post - Op Orders         Pain Scale (0-10):  __0___    Treatment: _x___ None    ____ See Post - Op/PCA Orders    Post - Operative Fluids:   _x___ Oral   ____ See Post - Op Orders  -------------as per surgeon    Plan: Discharge:   __x__Home       _____Floor     _____Critical Care    _____  Other:_________________    Comments:  No anesthesia issues or complications noted.  Discharge when criteria met.

## 2023-06-27 NOTE — ASU DISCHARGE PLAN (ADULT/PEDIATRIC) - PLEASE INDICATE TEMPERATURE IN FAHRENHEIT OR CELSIUS
Add High Risk Medication Management Associated Diagnosis?: Yes Patient Reported Weight(Optional But Include Units): 148 Comments: Patient tolerating Stelara with no side effects. Patient to continue 45mg every 12 weeks. Detail Level: Detailed 100.4 Length Of Therapy: 1 year

## 2023-06-27 NOTE — ASU PATIENT PROFILE, ADULT - FALL HARM RISK - UNIVERSAL INTERVENTIONS
Bed in lowest position, wheels locked, appropriate side rails in place/Call bell, personal items and telephone in reach/Instruct patient to call for assistance before getting out of bed or chair/Non-slip footwear when patient is out of bed/[place] to call system/Physically safe environment - no spills, clutter or unnecessary equipment/Purposeful Proactive Rounding/Room/bathroom lighting operational, light cord in reach

## 2023-06-27 NOTE — ASU DISCHARGE PLAN (ADULT/PEDIATRIC) - CARE PROVIDER_API CALL
Malissa Fam  Gastroenterology  [address] [place] [address]  Phone: [phone]  Fax: [phone]  Follow Up Time:

## 2023-06-28 LAB — SURGICAL PATHOLOGY STUDY: SIGNIFICANT CHANGE UP

## 2023-06-30 DIAGNOSIS — Z12.11 ENCOUNTER FOR SCREENING FOR MALIGNANT NEOPLASM OF COLON: ICD-10-CM

## 2023-06-30 DIAGNOSIS — E03.9 HYPOTHYROIDISM, UNSPECIFIED: ICD-10-CM

## 2023-06-30 DIAGNOSIS — K57.30 DIVERTICULOSIS OF LARGE INTESTINE WITHOUT PERFORATION OR ABSCESS WITHOUT BLEEDING: ICD-10-CM

## 2023-06-30 DIAGNOSIS — Z91.010 ALLERGY TO PEANUTS: ICD-10-CM

## 2023-06-30 DIAGNOSIS — J45.909 UNSPECIFIED ASTHMA, UNCOMPLICATED: ICD-10-CM

## 2023-06-30 DIAGNOSIS — G90.50 COMPLEX REGIONAL PAIN SYNDROME I, UNSPECIFIED: ICD-10-CM

## 2023-06-30 DIAGNOSIS — K64.8 OTHER HEMORRHOIDS: ICD-10-CM

## 2023-06-30 DIAGNOSIS — D12.3 BENIGN NEOPLASM OF TRANSVERSE COLON: ICD-10-CM

## 2023-07-05 ENCOUNTER — APPOINTMENT (OUTPATIENT)
Dept: PAIN MANAGEMENT | Facility: CLINIC | Age: 48
End: 2023-07-05
Payer: COMMERCIAL

## 2023-07-05 VITALS — WEIGHT: 180 LBS | HEIGHT: 63 IN | BODY MASS INDEX: 31.89 KG/M2

## 2023-07-05 PROCEDURE — 99214 OFFICE O/P EST MOD 30 MIN: CPT

## 2023-07-05 RX ORDER — METHYLPREDNISOLONE 4 MG/1
4 TABLET ORAL
Qty: 1 | Refills: 0 | Status: ACTIVE | COMMUNITY
Start: 2023-07-05 | End: 1900-01-01

## 2023-07-05 NOTE — HISTORY OF PRESENT ILLNESS
[FreeTextEntry1] : Patient is a 46 y/o woman presenting for a NPV for a history of left foot pain. She states that she stepped wrong on her left foot when walking down the stairs to her basement on 12/26/2022. She felt a pop over the dorsolateral aspect of the left foot and soon thereafter developed some pain and erythema/bruising over this region. She went to see the orthopedist the next day and underwent x-rays and then an MRI of the left foot. She did not follow initial instructions to be non weightbearing, but after a few days, she received a phone call from the office with MRI results and then complied with using a walking boot. At this point, the patient was mild and she did not require any medications for pain. However, three weeks after the initial injury, the patient suddenly developed a burning, tingling, stabbing pain over the right great toe and medial foot. She had pins and needles sensation, allodynia, and erythema. The patient called the orthopedist, who prescribed her gabapentin and told her she may have CRPS. Two days after starting gabapentin, she noticed significant improvement in pain. She then started in physical therapy and has been doing desensitization treatment there and at home and notes improvement of her symptoms. Currently, the patient endorses having allodynia over the dorsal aspect of the toes and foot. In the 1st and 3rd toes, the patient has intermittent blue discoloration. She has also had peeling, sloughing skin over the sole of the left foot. No hair growth over the left great toe. She also notes very slow toenail growth over all the toes, especially the 1st and 3rd toes. The patient states that she has had to cut the toenails on the right foot twice and has not had to cut the toenails on the left. No sweating, but the patient notes some joint stiffness over the great toe.  \par \par 7/5/23: Ms. Green was an existing patient of Dr. Noble who is switching to me. She has a history of CRPS type I in the left foot which started in January 2023. She has had improvement of pain but states the past couple of days the patients big toe has had this constant sharp, burning sensation in her lefttoe. She states she can't drive for long distance due too the vibration of the car irritates her foot. She notes her toe nails are changing color. This patient has utilized gabapentin and feels like this has kept her pain well controlled. She rates this pain 7/10 on the pain scale.\par \par She has managed this pain with PT and constantly being active. \par

## 2023-07-05 NOTE — PHYSICAL EXAM
[de-identified] : + allodynia over the dorsal aspect of the foot \par + allodynia left big toe\par                 bottom of foot \par                  top of foot \par

## 2023-07-05 NOTE — DISCUSSION/SUMMARY
[de-identified] : A discussion regarding available pain management treatment options occurred with the patient.  These included interventional, rehabilitative, pharmacological, and alternative modalities. We will proceed with the following:\par \par Interventional treatment options:\par - None indicated at present time\par \par Rehabilitative options:\par - continue physical therapy to stimulate the foot/toe\par \par Medication based treatment options:\par - initiate trial of Medrol dose pack then stop to see any improvement, if not take meloxicam for two weeks straight \par - continue Gabapentin \par \par Complementary treatment options:\par - lifestyle modifications discussed\par avoid anything that irritate the toe \par \par follow up 3-4 weeks \par \par I, Kassi Lau, attest that this documentation has been prepared under the direction and in the presence of Provider Sam Lopez DO\par The documentation recorded by the scribe, in my presence, accurately reflects the service I personally performed, and the decisions made by me with my edits as appropriate.\par \par Best Regards, \par Sam Lopez D.O.\par \par \par

## 2023-07-05 NOTE — DATA REVIEWED
[MRI] : MRI [Left] : left [Foot] : foot [I independently reviewed and interpreted images and report] : I independently reviewed and interpreted images and report [Report was reviewed and noted in the chart] : The report was reviewed and noted in the chart [FreeTextEntry1] : MRI left foot (1/3/2023)\par FINDINGS: \par There is no marrow edema in the navicular bone. There is marrow edema in the anterior process \par of the calcaneus measuring 1.5 cm where a nondisplaced fracture is suspected. There is a small \par nondisplaced fracture in the plantar cuboid. There is a contusion in the plantar medial aspect of \par the medial cuneiform measuring 1 cm. There is a contusion measuring 1 cm in the dorsal \par proximal lateral cuneiform. There is a subtle contusion in the plantar base of the second \par metatarsal where there is a mild Lisfranc ligament sprain. There are tiny subchondral cysts in \par the plantar base of the first metatarsal and lateral aspect of the proximal medial cuneiform base. \par There is no malalignment. There is moderate arthrosis in the first MTP which appears chronic.\par There is no tendon tear. There is slight patchy marrow edema in the lateral distal talus \par suggesting slight contusion or stress reaction.\par \par IMPRESSION:\par 1. Nondisplaced fracture of the anterior process of the calcaneus and small nondisplaced fracture \par in the plantar distal cuboid with contusions in the medial cuneiform and lateral cuneiform.\par 2. Mild Lisfranc ligament sprain and subtle patchy marrow edema in the plantar base of the \par second metatarsal which could represent stress reaction, contusion, or small nondisplaced \par avulsion injury without Lisfranc ligament discontinuity or malalignment of the midfoot.\par 3. Slight patchy marrow edema in the lateral distal talus suggesting slight contusion or stress \par reaction.\par 4. Small subcortical cysts in the medial cuneiform and base of the first metatarsal.\par 5. Moderate chronic appearing arthrosis of the first\par MTP. \par 6. No marrow edema in the navicular bone.

## 2023-07-06 PROBLEM — J45.909 UNSPECIFIED ASTHMA, UNCOMPLICATED: Chronic | Status: ACTIVE | Noted: 2023-06-27

## 2023-07-06 PROBLEM — E03.9 HYPOTHYROIDISM, UNSPECIFIED: Chronic | Status: ACTIVE | Noted: 2023-06-27

## 2023-07-06 PROBLEM — G90.50 COMPLEX REGIONAL PAIN SYNDROME I, UNSPECIFIED: Chronic | Status: ACTIVE | Noted: 2023-06-27

## 2023-07-17 ENCOUNTER — APPOINTMENT (OUTPATIENT)
Dept: GASTROENTEROLOGY | Facility: CLINIC | Age: 48
End: 2023-07-17
Payer: COMMERCIAL

## 2023-07-17 DIAGNOSIS — K63.5 POLYP OF COLON: ICD-10-CM

## 2023-07-17 PROCEDURE — 99213 OFFICE O/P EST LOW 20 MIN: CPT | Mod: 95

## 2023-07-18 PROBLEM — K63.5 COLON POLYP: Status: ACTIVE | Noted: 2023-07-18

## 2023-07-18 NOTE — REVIEW OF SYSTEMS
[As Noted in HPI] : as noted in HPI [Negative] : Musculoskeletal [Fever] : no fever [Chills] : no chills [Feeling Tired] : not feeling tired [Recent Weight Loss (___ Lbs)] : no recent weight loss [Sore Throat] : no sore throat [Hoarseness] : no hoarseness [Chest Pain] : no chest pain [Palpitations] : no palpitations [Lower Ext Edema (lower leg swelling)] : no lower extremity edema [Cough] : no cough [SOB on Exertion] : no shortness of breath during exertion [Abdominal Pain] : no abdominal pain [Vomiting] : no vomiting [Constipation] : no constipation [Diarrhea] : no diarrhea [Heartburn] : no heartburn [Melena (black stool)] : no melena

## 2023-07-18 NOTE — PHYSICAL EXAM
[Alert] : alert [Healthy Appearing] : healthy appearing [No Acute Distress] : no acute distress [Well Developed] : well developed

## 2023-07-18 NOTE — ASSESSMENT
[FreeTextEntry1] : 49yo female h/o asthma, hypothyroidism presents for follow up after colonoscopy performed on 6/27/23 revealing one tubular adenoma in transverse colon, sigmoid diverticulosis and small internal hemorrhoids. No anemia. \par \par -Recommend repeat colonoscopy in 5 years for surveillance, sooner if new symptoms develop\par -Encouraged adequate hydration 6-8 glasses water daily and fiber intake ~25g daily\par -Discussed avoidance of straining/constipation\par \par \par Follow up 2-3 months\par Pt agreeable with plan, advised to contact us if questions/concerns in the interim\par

## 2023-07-18 NOTE — HISTORY OF PRESENT ILLNESS
[Home] : at home, [unfilled] , at the time of the visit. [Medical Office: ([facility])___] : at the medical office located in  [Verbal consent obtained from patient] : the patient, [unfilled] [FreeTextEntry4] : KRIS CLEARY [FreeTextEntry1] : 6/27/23

## 2023-07-27 ENCOUNTER — NON-APPOINTMENT (OUTPATIENT)
Age: 48
End: 2023-07-27

## 2023-08-02 ENCOUNTER — APPOINTMENT (OUTPATIENT)
Dept: PAIN MANAGEMENT | Facility: CLINIC | Age: 48
End: 2023-08-02
Payer: COMMERCIAL

## 2023-08-02 DIAGNOSIS — S99.922A UNSPECIFIED INJURY OF LEFT FOOT, INITIAL ENCOUNTER: ICD-10-CM

## 2023-08-02 PROCEDURE — 99214 OFFICE O/P EST MOD 30 MIN: CPT

## 2023-08-02 RX ORDER — MELOXICAM 15 MG/1
15 TABLET ORAL DAILY
Qty: 30 | Refills: 0 | Status: ACTIVE | COMMUNITY
Start: 2023-08-02 | End: 1900-01-01

## 2023-08-02 NOTE — DISCUSSION/SUMMARY
[de-identified] : A discussion regarding available pain management treatment options occurred with the patient.  These included interventional, rehabilitative, pharmacological, and alternative modalities. We will proceed with the following:  Rehabilitative options: - continue physical therapy to stimulate the foot/toe  Medication based treatment options: - Reordered of meloxicam for two weeks straight and if the pain is still there take another two weeks  - continue Gabapentin   Complementary treatment options: - lifestyle modifications discussed avoid anything that irritate the toe   follow up 5-6 weeks   I, Kassi Lau, attest that this documentation has been prepared under the direction and in the presence of Provider Sam Lopez, DO The documentation recorded by the scribe, in my presence, accurately reflects the service I personally performed, and the decisions made by me with my edits as appropriate.  Best Regards,  Sam Lopez D.O.

## 2023-08-02 NOTE — PHYSICAL EXAM
[de-identified] : + allodynia over the dorsal aspect of the foot \par  + allodynia left big toe\par                  bottom of foot \par                   top of foot \par

## 2023-08-02 NOTE — HISTORY OF PRESENT ILLNESS
[FreeTextEntry1] : Patient is a 48 y/o woman presenting for a NPV for a history of left foot pain. She states that she stepped wrong on her left foot when walking down the stairs to her basement on 12/26/2022. She felt a pop over the dorsolateral aspect of the left foot and soon thereafter developed some pain and erythema/bruising over this region. She went to see the orthopedist the next day and underwent x-rays and then an MRI of the left foot. She did not follow initial instructions to be non weightbearing, but after a few days, she received a phone call from the office with MRI results and then complied with using a walking boot. At this point, the patient was mild and she did not require any medications for pain. However, three weeks after the initial injury, the patient suddenly developed a burning, tingling, stabbing pain over the right great toe and medial foot. She had pins and needles sensation, allodynia, and erythema. The patient called the orthopedist, who prescribed her gabapentin and told her she may have CRPS. Two days after starting gabapentin, she noticed significant improvement in pain. She then started in physical therapy and has been doing desensitization treatment there and at home and notes improvement of her symptoms. Currently, the patient endorses having allodynia over the dorsal aspect of the toes and foot. In the 1st and 3rd toes, the patient has intermittent blue discoloration. She has also had peeling, sloughing skin over the sole of the left foot. No hair growth over the left great toe. She also notes very slow toenail growth over all the toes, especially the 1st and 3rd toes. The patient states that she has had to cut the toenails on the right foot twice and has not had to cut the toenails on the left. No sweating, but the patient notes some joint stiffness over the great toe.    8/2/23: Today this patient is here for a follow up visit. She reports of no relief with the oral steroid then started the meloxicam which helped the most the last couple days. She finished the meloxicam Monday and states the day before she noticed pain relief with improvement.  As of today, the pain is localized in the big toe. When touching the big toe, it is cooler to touch than the rest of her foot. She states she has intermittent spasms in foot during the night when laying down. She reports of being 50% better since this started in January 2023. She has managed this pain with PT and constantly being active.   Pt denies bowel/bladder incontinence, weakness, falls, unsteadiness.

## 2023-08-31 ENCOUNTER — APPOINTMENT (OUTPATIENT)
Dept: ORTHOPEDIC SURGERY | Facility: CLINIC | Age: 48
End: 2023-08-31
Payer: COMMERCIAL

## 2023-08-31 DIAGNOSIS — S93.622A SPRAIN OF TARSOMETATARSAL LIGAMENT OF LEFT FOOT, INITIAL ENCOUNTER: ICD-10-CM

## 2023-08-31 PROCEDURE — 99213 OFFICE O/P EST LOW 20 MIN: CPT | Mod: 25

## 2023-08-31 PROCEDURE — 73630 X-RAY EXAM OF FOOT: CPT | Mod: LT

## 2023-08-31 NOTE — HISTORY OF PRESENT ILLNESS
[de-identified] : 48-year-old woman returns for interval follow-up of left midfoot and ankle sprains resulting in complex regional pain syndrome.  She sustained a twisting injury January 6, 2023.  She has been followed by pain management.  She remains on gabapentin.  She tried a brief course of Medrol Dosepak and subsequently a month of meloxicam without significant relief.  Despite this her activity level and function of dramatically improved.  She is been able to go back to yoga but notes that when she has to stretch her foot underneath her and child's pose she has pain.  In addition to the stretching discomfort she also has a low-level dysesthetic sensation about her foot; akin to electrical sensation.  She is now walking in regular shoes.  She is not utilizing any supportive braces or cane.

## 2023-08-31 NOTE — IMAGING
[de-identified] : Daina middle-age woman sits comfortably in my office no distress.  Physical examination: Left foot and ankle: No longer has tenderness to palpation focally along the bony prominences of her midfoot or ankle.  I am able to range her ankle without difficulty but there is some tightness of the extensor tendons of her toe and ankle.  Arch is maintained there are no callosities ulcers.  Remains with dysesthetic sensation of the plantar aspects of her foot.  2+ DP and PT pulses.  Good capillary refill.  No skin changes.  Radiographs: Bilateral foot (AP, lateral, oblique): No osteopenia suggested of ongoing RSD.  Mild flattening of the arch of her foot suggestive of some mild midfoot arthritis.  Otherwise unremarkable examination.

## 2023-08-31 NOTE — ASSESSMENT
[FreeTextEntry1] : 48-year-old woman who is midfoot and ankle sprains have resolved.  She has some residual tightness of the extensor tendons of her foot and ankle.  She also is contending with complex regional pain syndrome in her foot which seems to be improving.  Would defer to her pain management doctor.  Would continue with the gabapentin teen and consider may be selective nerve blocks if the pain worsens or persist.  In addition I would like her to continue working on desensitization of the regional pain syndrome.  I also think that she benefits from some more  physical therapy for the tightness of her foot and ankle.  Physical therapy with us focus on extensor tendon stretching and strengthening exercises generalized foot and ankle strengthening exercises and desensitization with modalities utilize adjunct of therapy.  Modalities should include desensitization.  We will have her follow-up in my office in 6 months time for interval check.  She will not need radiographs.

## 2023-10-02 ENCOUNTER — APPOINTMENT (OUTPATIENT)
Dept: GASTROENTEROLOGY | Facility: CLINIC | Age: 48
End: 2023-10-02

## 2023-10-06 ENCOUNTER — RX RENEWAL (OUTPATIENT)
Age: 48
End: 2023-10-06

## 2023-10-06 RX ORDER — FAMOTIDINE 20 MG/1
20 TABLET, FILM COATED ORAL TWICE DAILY
Qty: 60 | Refills: 2 | Status: ACTIVE | COMMUNITY
Start: 2023-05-05 | End: 1900-01-01

## 2023-10-06 RX ORDER — MELOXICAM 15 MG/1
15 TABLET ORAL
Qty: 30 | Refills: 2 | Status: ACTIVE | COMMUNITY
Start: 2023-05-05 | End: 1900-01-01

## 2023-10-11 RX ORDER — GABAPENTIN 600 MG/1
600 TABLET, COATED ORAL 3 TIMES DAILY
Qty: 90 | Refills: 2 | Status: ACTIVE | COMMUNITY
Start: 2023-04-20 | End: 1900-01-01

## 2023-10-12 ENCOUNTER — RX RENEWAL (OUTPATIENT)
Age: 48
End: 2023-10-12

## 2023-10-12 RX ORDER — GABAPENTIN 300 MG/1
300 CAPSULE ORAL
Qty: 90 | Refills: 2 | Status: ACTIVE | COMMUNITY
Start: 2023-03-30 | End: 1900-01-01

## 2023-11-06 ENCOUNTER — TRANSCRIPTION ENCOUNTER (OUTPATIENT)
Age: 48
End: 2023-11-06

## 2023-11-06 RX ORDER — GABAPENTIN 300 MG/1
300 CAPSULE ORAL
Qty: 60 | Refills: 2 | Status: ACTIVE | COMMUNITY
Start: 2023-11-06 | End: 1900-01-01

## 2024-02-16 ENCOUNTER — APPOINTMENT (OUTPATIENT)
Dept: PAIN MANAGEMENT | Facility: CLINIC | Age: 49
End: 2024-02-16
Payer: MEDICAID

## 2024-02-16 ENCOUNTER — APPOINTMENT (OUTPATIENT)
Dept: ORTHOPEDIC SURGERY | Facility: CLINIC | Age: 49
End: 2024-02-16

## 2024-02-16 DIAGNOSIS — G90.522 COMPLEX REGIONAL PAIN SYNDROME I OF LEFT LOWER LIMB: ICD-10-CM

## 2024-02-16 PROCEDURE — 99214 OFFICE O/P EST MOD 30 MIN: CPT

## 2024-02-16 RX ORDER — GABAPENTIN 600 MG/1
600 TABLET, COATED ORAL 3 TIMES DAILY
Qty: 90 | Refills: 3 | Status: ACTIVE | COMMUNITY
Start: 2024-02-16 | End: 1900-01-01

## 2024-02-16 RX ORDER — MELOXICAM 15 MG/1
15 TABLET ORAL
Qty: 30 | Refills: 1 | Status: ACTIVE | COMMUNITY
Start: 2024-02-16 | End: 1900-01-01

## 2024-02-16 RX ORDER — GABAPENTIN 300 MG/1
300 CAPSULE ORAL 3 TIMES DAILY
Qty: 90 | Refills: 2 | Status: ACTIVE | COMMUNITY
Start: 2024-02-16 | End: 1900-01-01

## 2024-02-16 NOTE — PHYSICAL EXAM
[de-identified] : + allodynia over the dorsal aspect of the foot \par  + allodynia left big toe\par                  bottom of foot \par                   top of foot \par

## 2024-02-16 NOTE — HISTORY OF PRESENT ILLNESS
[FreeTextEntry1] : Patient is a 48 y/o woman presenting for a NPV for a history of left foot pain. She states that she stepped wrong on her left foot when walking down the stairs to her basement on 12/26/2022. She felt a pop over the dorsolateral aspect of the left foot and soon thereafter developed some pain and erythema/bruising over this region. She went to see the orthopedist the next day and underwent x-rays and then an MRI of the left foot. She did not follow initial instructions to be non weightbearing, but after a few days, she received a phone call from the office with MRI results and then complied with using a walking boot. At this point, the patient was mild and she did not require any medications for pain. However, three weeks after the initial injury, the patient suddenly developed a burning, tingling, stabbing pain over the right great toe and medial foot. She had pins and needles sensation, allodynia, and erythema. The patient called the orthopedist, who prescribed her gabapentin and told her she may have CRPS. Two days after starting gabapentin, she noticed significant improvement in pain. She then started in physical therapy and has been doing desensitization treatment there and at home and notes improvement of her symptoms. Currently, the patient endorses having allodynia over the dorsal aspect of the toes and foot. In the 1st and 3rd toes, the patient has intermittent blue discoloration. She has also had peeling, sloughing skin over the sole of the left foot. No hair growth over the left great toe. She also notes very slow toenail growth over all the toes, especially the 1st and 3rd toes. The patient states that she has had to cut the toenails on the right foot twice and has not had to cut the toenails on the left. No sweating, but the patient notes some joint stiffness over the great toe.    8/2/23: Today this patient is here for a follow up visit. She reports of no relief with the oral steroid then started the meloxicam which helped the most the last couple days. She finished the meloxicam Monday and states the day before she noticed pain relief with improvement.  As of today, the pain is localized in the big toe. When touching the big toe, it is cooler to touch than the rest of her foot. She states she has intermittent spasms in foot during the night when laying down. She reports of being 50% better since this started in January 2023. She has managed this pain with PT and constantly being active.   Pt denies bowel/bladder incontinence, weakness, falls, unsteadiness.  PRESENTING TODAY 02-: Patient presents to the office today for a follow up visit after a long delay with continued pain which is localized in the big toe. When touching the big toe, it is cooler to touch than the rest of her foot. She states she has intermittent spasms in foot during the night when laying down. She notes the pain is described as static and is usually always cold in nature.   She has been taking Gabapentin daily which provides her with 30-40% improvement in pain and increase in function. She denies any side effects.

## 2024-03-08 RX ORDER — BUDESONIDE AND FORMOTEROL FUMARATE DIHYDRATE 160; 4.5 UG/1; UG/1
160-4.5 AEROSOL RESPIRATORY (INHALATION) TWICE DAILY
Qty: 1 | Refills: 3 | Status: ACTIVE | COMMUNITY
Start: 2021-12-23 | End: 1900-01-01

## 2024-06-19 ENCOUNTER — APPOINTMENT (OUTPATIENT)
Dept: CARDIOLOGY | Facility: CLINIC | Age: 49
End: 2024-06-19
Payer: MEDICAID

## 2024-06-19 VITALS
HEIGHT: 63 IN | OXYGEN SATURATION: 100 % | DIASTOLIC BLOOD PRESSURE: 98 MMHG | SYSTOLIC BLOOD PRESSURE: 140 MMHG | BODY MASS INDEX: 31.01 KG/M2 | HEART RATE: 63 BPM | WEIGHT: 175 LBS

## 2024-06-19 DIAGNOSIS — R00.2 PALPITATIONS: ICD-10-CM

## 2024-06-19 PROCEDURE — 99204 OFFICE O/P NEW MOD 45 MIN: CPT

## 2024-06-26 ENCOUNTER — APPOINTMENT (OUTPATIENT)
Dept: CARDIOLOGY | Facility: CLINIC | Age: 49
End: 2024-06-26

## 2024-06-26 PROCEDURE — ZZZZZ: CPT

## 2024-07-02 NOTE — ASSESSMENT
[FreeTextEntry1] : 48 y/o F with PMHx of Asthma, Lyme disease, GERD, Hypothyroidism and Cervical Ca s/p LEEP chronic left foot pain syndrome. Presents today with c/o of frequent palpitations and some 'chest sensation' denies CP, dyspnea or claudication.   Plan: - Echo - Event monitor

## 2024-07-02 NOTE — PHYSICAL EXAM
[General Appearance - Well Developed] : well developed [Normal Oral Mucosa] : normal oral mucosa [Heart Rate And Rhythm] : heart rate and rhythm were normal [Abdomen Soft] : soft [Cyanosis, Localized] : no localized cyanosis [] : no rash [Oriented To Time, Place, And Person] : oriented to person, place, and time

## 2024-07-15 ENCOUNTER — APPOINTMENT (OUTPATIENT)
Dept: CARDIOLOGY | Facility: CLINIC | Age: 49
End: 2024-07-15
Payer: MEDICAID

## 2024-07-15 PROCEDURE — 93306 TTE W/DOPPLER COMPLETE: CPT

## 2024-08-07 ENCOUNTER — APPOINTMENT (OUTPATIENT)
Dept: CARDIOLOGY | Facility: CLINIC | Age: 49
End: 2024-08-07

## 2024-08-07 PROCEDURE — 99214 OFFICE O/P EST MOD 30 MIN: CPT

## 2024-08-07 NOTE — HISTORY OF PRESENT ILLNESS
[FreeTextEntry1] : 50 y/o F with PMHx of Asthma, Lyme disease, GERD, Hypothyroidism and Cervical Ca s/p LEEP chronic left foot pain syndrome. Presents today with c/o of frequent palpitations and some 'chest sensation' denies CP, dyspnea or claudication.   Patient presents for follow up visit. Pt endorses of palpitations occurring 3-4 times weekly. Reports feeling "chest sensation" and feeling the palpitations in throat. Denies SOB.   Data reviewed today:  TTE 7/15/24: LVEF 56%, no significant valvular disease, aortic root at the sinuses of Valsalva is dilated measuring 4.0 cm  Event Monitor 6/26/24-7/9/24: Sinus Rhythm 48-146bpm 8,110 PVCs, PVC burden of 1%  3/29/23:  A1C 5.5 TSH .50 GFR 95

## 2024-08-07 NOTE — HISTORY OF PRESENT ILLNESS
[FreeTextEntry1] : 48 y/o F with PMHx of Asthma, Lyme disease, GERD, Hypothyroidism and Cervical Ca s/p LEEP chronic left foot pain syndrome. Presents today with c/o of frequent palpitations and some 'chest sensation' denies CP, dyspnea or claudication.   Patient presents for follow up visit. Pt endorses of palpitations occurring 3-4 times weekly. Reports feeling "chest sensation" and feeling the palpitations in throat. Denies SOB.   Data reviewed today:  TTE 7/15/24: LVEF 56%, no significant valvular disease, aortic root at the sinuses of Valsalva is dilated measuring 4.0 cm  Event Monitor 6/26/24-7/9/24: Sinus Rhythm 48-146bpm 8,110 PVCs, PVC burden of 1%  3/29/23:  A1C 5.5 TSH .50 GFR 95

## 2024-08-07 NOTE — ASSESSMENT
[FreeTextEntry1] : 48 y/o F with PMHx of Asthma, Lyme disease, GERD, Hypothyroidism and Cervical Ca s/p LEEP chronic left foot pain syndrome. Presents today with c/o of frequent palpitations and some 'chest sensation' denies CP, dyspnea or claudication.   Plan: -Start Metoprolol 12.5 mg BID for palpitations -Repeat fasting bloodwork before next appointment -F/U in 4 months

## 2024-08-15 ENCOUNTER — APPOINTMENT (OUTPATIENT)
Dept: PAIN MANAGEMENT | Facility: CLINIC | Age: 49
End: 2024-08-15

## 2024-08-26 ENCOUNTER — NON-APPOINTMENT (OUTPATIENT)
Age: 49
End: 2024-08-26

## 2024-08-26 RX ORDER — METOPROLOL SUCCINATE 25 MG/1
25 TABLET, EXTENDED RELEASE ORAL DAILY
Qty: 90 | Refills: 1 | Status: ACTIVE | COMMUNITY
Start: 2024-08-26 | End: 1900-01-01

## 2024-10-29 ENCOUNTER — OUTPATIENT (OUTPATIENT)
Dept: OUTPATIENT SERVICES | Facility: HOSPITAL | Age: 49
LOS: 1 days | End: 2024-10-29
Payer: MEDICAID

## 2024-10-29 ENCOUNTER — RESULT REVIEW (OUTPATIENT)
Age: 49
End: 2024-10-29

## 2024-10-29 DIAGNOSIS — Z40.03 ENCOUNTER FOR PROPHYLACTIC REMOVAL OF FALLOPIAN TUBE(S): Chronic | ICD-10-CM

## 2024-10-29 DIAGNOSIS — Z98.890 OTHER SPECIFIED POSTPROCEDURAL STATES: Chronic | ICD-10-CM

## 2024-10-29 DIAGNOSIS — Z12.31 ENCOUNTER FOR SCREENING MAMMOGRAM FOR MALIGNANT NEOPLASM OF BREAST: ICD-10-CM

## 2024-10-29 PROCEDURE — 77067 SCR MAMMO BI INCL CAD: CPT | Mod: 26

## 2024-10-29 PROCEDURE — 77063 BREAST TOMOSYNTHESIS BI: CPT

## 2024-10-29 PROCEDURE — 77067 SCR MAMMO BI INCL CAD: CPT

## 2024-10-29 PROCEDURE — 77063 BREAST TOMOSYNTHESIS BI: CPT | Mod: 26

## 2024-10-30 DIAGNOSIS — Z12.31 ENCOUNTER FOR SCREENING MAMMOGRAM FOR MALIGNANT NEOPLASM OF BREAST: ICD-10-CM

## 2024-11-01 ENCOUNTER — APPOINTMENT (OUTPATIENT)
Dept: OBGYN | Facility: CLINIC | Age: 49
End: 2024-11-01
Payer: COMMERCIAL

## 2024-11-01 ENCOUNTER — OUTPATIENT (OUTPATIENT)
Dept: OUTPATIENT SERVICES | Facility: HOSPITAL | Age: 49
LOS: 1 days | End: 2024-11-01
Payer: COMMERCIAL

## 2024-11-01 VITALS — WEIGHT: 168.3 LBS | HEIGHT: 63 IN | BODY MASS INDEX: 29.82 KG/M2

## 2024-11-01 VITALS — DIASTOLIC BLOOD PRESSURE: 78 MMHG | SYSTOLIC BLOOD PRESSURE: 128 MMHG

## 2024-11-01 DIAGNOSIS — Z00.00 ENCOUNTER FOR GENERAL ADULT MEDICAL EXAMINATION WITHOUT ABNORMAL FINDINGS: ICD-10-CM

## 2024-11-01 DIAGNOSIS — Z98.890 OTHER SPECIFIED POSTPROCEDURAL STATES: Chronic | ICD-10-CM

## 2024-11-01 DIAGNOSIS — Z40.03 ENCOUNTER FOR PROPHYLACTIC REMOVAL OF FALLOPIAN TUBE(S): Chronic | ICD-10-CM

## 2024-11-01 DIAGNOSIS — Z01.419 ENCOUNTER FOR GYNECOLOGICAL EXAMINATION (GENERAL) (ROUTINE) W/OUT ABNORMAL FINDINGS: ICD-10-CM

## 2024-11-01 PROCEDURE — 99459 PELVIC EXAMINATION: CPT

## 2024-11-01 PROCEDURE — 87624 HPV HI-RISK TYP POOLED RSLT: CPT

## 2024-11-01 PROCEDURE — 99396 PREV VISIT EST AGE 40-64: CPT

## 2024-11-01 PROCEDURE — 99386 PREV VISIT NEW AGE 40-64: CPT

## 2024-11-01 PROCEDURE — 88142 CYTOPATH C/V THIN LAYER: CPT

## 2024-11-04 ENCOUNTER — OUTPATIENT (OUTPATIENT)
Dept: OUTPATIENT SERVICES | Facility: HOSPITAL | Age: 49
LOS: 1 days | End: 2024-11-04

## 2024-11-04 DIAGNOSIS — Z01.419 ENCOUNTER FOR GYNECOLOGICAL EXAMINATION (GENERAL) (ROUTINE) WITHOUT ABNORMAL FINDINGS: ICD-10-CM

## 2024-11-04 DIAGNOSIS — Z40.03 ENCOUNTER FOR PROPHYLACTIC REMOVAL OF FALLOPIAN TUBE(S): Chronic | ICD-10-CM

## 2024-11-04 DIAGNOSIS — Z98.890 OTHER SPECIFIED POSTPROCEDURAL STATES: Chronic | ICD-10-CM

## 2024-11-05 DIAGNOSIS — Z01.419 ENCOUNTER FOR GYNECOLOGICAL EXAMINATION (GENERAL) (ROUTINE) WITHOUT ABNORMAL FINDINGS: ICD-10-CM

## 2024-11-08 LAB
CYTOLOGY CVX/VAG DOC THIN PREP: NORMAL
HPV HIGH+LOW RISK DNA PNL CVX: NOT DETECTED

## 2024-12-11 ENCOUNTER — APPOINTMENT (OUTPATIENT)
Dept: CARDIOLOGY | Facility: CLINIC | Age: 49
End: 2024-12-11
Payer: MEDICAID

## 2024-12-11 ENCOUNTER — NON-APPOINTMENT (OUTPATIENT)
Age: 49
End: 2024-12-11

## 2024-12-11 VITALS
BODY MASS INDEX: 29.23 KG/M2 | WEIGHT: 165 LBS | HEART RATE: 76 BPM | DIASTOLIC BLOOD PRESSURE: 83 MMHG | HEIGHT: 63 IN | SYSTOLIC BLOOD PRESSURE: 122 MMHG

## 2024-12-11 PROCEDURE — 99214 OFFICE O/P EST MOD 30 MIN: CPT

## 2025-01-07 ENCOUNTER — RX RENEWAL (OUTPATIENT)
Age: 50
End: 2025-01-07

## 2025-03-06 ENCOUNTER — NON-APPOINTMENT (OUTPATIENT)
Age: 50
End: 2025-03-06

## 2025-03-12 ENCOUNTER — APPOINTMENT (OUTPATIENT)
Dept: CARDIOLOGY | Facility: CLINIC | Age: 50
End: 2025-03-12

## 2025-05-12 DIAGNOSIS — J30.2 OTHER SEASONAL ALLERGIC RHINITIS: ICD-10-CM

## 2025-05-12 RX ORDER — FEXOFENADINE HCL 180 MG/1
180 TABLET, FILM COATED ORAL DAILY
Qty: 20 | Refills: 0 | Status: ACTIVE | COMMUNITY
Start: 2025-05-12 | End: 1900-01-01

## 2025-06-20 NOTE — DISCUSSION/SUMMARY
[de-identified] : A discussion regarding available pain management treatment options occurred with the patient.  These included interventional, rehabilitative, pharmacological, and alternative modalities. We will proceed with the following:  Interventional treatment options: - Discusses SCS trial.   Rehabilitative options: - continue physical therapy to stimulate the foot/toe.  Medication based treatment options: - reordered Gabapentin 600mg TID for a duration of 4 weeks.  - reordered Gabapentin 300mg TID for a duration of 4 weeks.  - reorderedd Meloxicam 15mg daily for 4 weeks. Discussed risks and benefits. Avoid taking for any side effects.  Complementary treatment options: - c/w lifestyle modifications discussed. - avoid anything that irritate the toe.  Follow up in 6 months PRN.   I, Jon Falcon, attest that this documentation has been prepared under the direction and in the presence of Provider Sam Lopez, DO The documentation recorded by the scribe, in my presence, accurately reflects the service I personally performed, and the decisions made by me with my edits as appropriate.  Best Regards, Sam Lopez D.O. Unknown if ever smoked

## 2025-08-15 ENCOUNTER — INPATIENT (INPATIENT)
Facility: HOSPITAL | Age: 50
LOS: 2 days | Discharge: HOME | DRG: 720 | End: 2025-08-18
Attending: STUDENT IN AN ORGANIZED HEALTH CARE EDUCATION/TRAINING PROGRAM | Admitting: INTERNAL MEDICINE
Payer: COMMERCIAL

## 2025-08-15 VITALS
HEART RATE: 129 BPM | RESPIRATION RATE: 18 BRPM | SYSTOLIC BLOOD PRESSURE: 172 MMHG | HEIGHT: 63 IN | WEIGHT: 169.98 LBS | DIASTOLIC BLOOD PRESSURE: 87 MMHG | TEMPERATURE: 103 F | OXYGEN SATURATION: 98 %

## 2025-08-15 DIAGNOSIS — Z40.03 ENCOUNTER FOR PROPHYLACTIC REMOVAL OF FALLOPIAN TUBE(S): Chronic | ICD-10-CM

## 2025-08-15 DIAGNOSIS — A41.9 SEPSIS, UNSPECIFIED ORGANISM: ICD-10-CM

## 2025-08-15 DIAGNOSIS — Z98.890 OTHER SPECIFIED POSTPROCEDURAL STATES: Chronic | ICD-10-CM

## 2025-08-15 LAB
ALBUMIN SERPL ELPH-MCNC: 3.5 G/DL — SIGNIFICANT CHANGE UP (ref 3.5–5.2)
ALBUMIN SERPL ELPH-MCNC: 3.8 G/DL — SIGNIFICANT CHANGE UP (ref 3.5–5.2)
ALBUMIN SERPL ELPH-MCNC: 3.8 G/DL — SIGNIFICANT CHANGE UP (ref 3.5–5.2)
ALP SERPL-CCNC: 228 U/L — HIGH (ref 30–115)
ALP SERPL-CCNC: 231 U/L — HIGH (ref 30–115)
ALP SERPL-CCNC: 234 U/L — HIGH (ref 30–115)
ALT FLD-CCNC: 122 U/L — HIGH (ref 0–41)
ALT FLD-CCNC: 123 U/L — HIGH (ref 0–41)
ALT FLD-CCNC: 132 U/L — HIGH (ref 0–41)
ANION GAP SERPL CALC-SCNC: 10 MMOL/L — SIGNIFICANT CHANGE UP (ref 7–14)
ANION GAP SERPL CALC-SCNC: 13 MMOL/L — SIGNIFICANT CHANGE UP (ref 7–14)
ANION GAP SERPL CALC-SCNC: 15 MMOL/L — HIGH (ref 7–14)
APPEARANCE UR: CLEAR — SIGNIFICANT CHANGE UP
APTT BLD: 29.9 SEC — SIGNIFICANT CHANGE UP (ref 27–39.2)
AST SERPL-CCNC: 127 U/L — HIGH (ref 0–41)
AST SERPL-CCNC: 95 U/L — HIGH (ref 0–41)
AST SERPL-CCNC: 99 U/L — HIGH (ref 0–41)
BASOPHILS # BLD AUTO: 0 K/UL — SIGNIFICANT CHANGE UP (ref 0–0.2)
BASOPHILS # BLD AUTO: 0.01 K/UL — SIGNIFICANT CHANGE UP (ref 0–0.2)
BASOPHILS NFR BLD AUTO: 0 % — SIGNIFICANT CHANGE UP (ref 0–1)
BASOPHILS NFR BLD AUTO: 0.2 % — SIGNIFICANT CHANGE UP (ref 0–1)
BILIRUB SERPL-MCNC: 0.4 MG/DL — SIGNIFICANT CHANGE UP (ref 0.2–1.2)
BILIRUB UR-MCNC: NEGATIVE — SIGNIFICANT CHANGE UP
BUN SERPL-MCNC: 4 MG/DL — LOW (ref 10–20)
BUN SERPL-MCNC: 5 MG/DL — LOW (ref 10–20)
BUN SERPL-MCNC: 6 MG/DL — LOW (ref 10–20)
CALCIUM SERPL-MCNC: 8 MG/DL — LOW (ref 8.4–10.5)
CALCIUM SERPL-MCNC: 8.2 MG/DL — LOW (ref 8.4–10.5)
CALCIUM SERPL-MCNC: 8.6 MG/DL — SIGNIFICANT CHANGE UP (ref 8.4–10.5)
CHLORIDE SERPL-SCNC: 101 MMOL/L — SIGNIFICANT CHANGE UP (ref 98–110)
CHLORIDE SERPL-SCNC: 101 MMOL/L — SIGNIFICANT CHANGE UP (ref 98–110)
CHLORIDE SERPL-SCNC: 98 MMOL/L — SIGNIFICANT CHANGE UP (ref 98–110)
CO2 SERPL-SCNC: 19 MMOL/L — SIGNIFICANT CHANGE UP (ref 17–32)
CO2 SERPL-SCNC: 20 MMOL/L — SIGNIFICANT CHANGE UP (ref 17–32)
CO2 SERPL-SCNC: 21 MMOL/L — SIGNIFICANT CHANGE UP (ref 17–32)
COLOR SPEC: YELLOW — SIGNIFICANT CHANGE UP
CREAT SERPL-MCNC: 0.6 MG/DL — LOW (ref 0.7–1.5)
CREAT SERPL-MCNC: 0.7 MG/DL — SIGNIFICANT CHANGE UP (ref 0.7–1.5)
CREAT SERPL-MCNC: 0.7 MG/DL — SIGNIFICANT CHANGE UP (ref 0.7–1.5)
DIFF PNL FLD: ABNORMAL
EGFR: 106 ML/MIN/1.73M2 — SIGNIFICANT CHANGE UP
EGFR: 110 ML/MIN/1.73M2 — SIGNIFICANT CHANGE UP
EGFR: 110 ML/MIN/1.73M2 — SIGNIFICANT CHANGE UP
EOSINOPHIL # BLD AUTO: 0.01 K/UL — SIGNIFICANT CHANGE UP (ref 0–0.7)
EOSINOPHIL # BLD AUTO: 0.06 K/UL — SIGNIFICANT CHANGE UP (ref 0–0.7)
EOSINOPHIL NFR BLD AUTO: 0.2 % — SIGNIFICANT CHANGE UP (ref 0–8)
EOSINOPHIL NFR BLD AUTO: 1.7 % — SIGNIFICANT CHANGE UP (ref 0–8)
FLUAV AG NPH QL: SIGNIFICANT CHANGE UP
FLUBV AG NPH QL: SIGNIFICANT CHANGE UP
GAS PNL BLDV: SIGNIFICANT CHANGE UP
GLUCOSE SERPL-MCNC: 100 MG/DL — HIGH (ref 70–99)
GLUCOSE SERPL-MCNC: 93 MG/DL — SIGNIFICANT CHANGE UP (ref 70–99)
GLUCOSE SERPL-MCNC: 96 MG/DL — SIGNIFICANT CHANGE UP (ref 70–99)
GLUCOSE UR QL: NEGATIVE MG/DL — SIGNIFICANT CHANGE UP
HCG SERPL QL: NEGATIVE — SIGNIFICANT CHANGE UP
HCT VFR BLD CALC: 31.5 % — LOW (ref 37–47)
HCT VFR BLD CALC: 32.6 % — LOW (ref 37–47)
HCT VFR BLD CALC: 32.7 % — LOW (ref 37–47)
HGB BLD-MCNC: 10.5 G/DL — LOW (ref 12–16)
HGB BLD-MCNC: 10.6 G/DL — LOW (ref 12–16)
HGB BLD-MCNC: 10.6 G/DL — LOW (ref 12–16)
IMM GRANULOCYTES NFR BLD AUTO: 0.6 % — HIGH (ref 0.1–0.3)
INR BLD: 1.28 RATIO — SIGNIFICANT CHANGE UP (ref 0.65–1.3)
KETONES UR QL: NEGATIVE MG/DL — SIGNIFICANT CHANGE UP
LEUKOCYTE ESTERASE UR-ACNC: NEGATIVE — SIGNIFICANT CHANGE UP
LYMPHOCYTES # BLD AUTO: 0.23 K/UL — LOW (ref 1.2–3.4)
LYMPHOCYTES # BLD AUTO: 0.64 K/UL — LOW (ref 1.2–3.4)
LYMPHOCYTES # BLD AUTO: 13 % — LOW (ref 20.5–51.1)
LYMPHOCYTES # BLD AUTO: 6.1 % — LOW (ref 20.5–51.1)
MAGNESIUM SERPL-MCNC: 1.7 MG/DL — LOW (ref 1.8–2.4)
MCHC RBC-ENTMCNC: 26.2 PG — LOW (ref 27–31)
MCHC RBC-ENTMCNC: 26.5 PG — LOW (ref 27–31)
MCHC RBC-ENTMCNC: 26.8 PG — LOW (ref 27–31)
MCHC RBC-ENTMCNC: 32.4 G/DL — SIGNIFICANT CHANGE UP (ref 32–37)
MCHC RBC-ENTMCNC: 32.5 G/DL — SIGNIFICANT CHANGE UP (ref 32–37)
MCHC RBC-ENTMCNC: 33.3 G/DL — SIGNIFICANT CHANGE UP (ref 32–37)
MCV RBC AUTO: 80.4 FL — LOW (ref 81–99)
MCV RBC AUTO: 80.7 FL — LOW (ref 81–99)
MCV RBC AUTO: 81.8 FL — SIGNIFICANT CHANGE UP (ref 81–99)
MONOCYTES # BLD AUTO: 0.32 K/UL — SIGNIFICANT CHANGE UP (ref 0.1–0.6)
MONOCYTES # BLD AUTO: 0.32 K/UL — SIGNIFICANT CHANGE UP (ref 0.1–0.6)
MONOCYTES NFR BLD AUTO: 6.5 % — SIGNIFICANT CHANGE UP (ref 1.7–9.3)
MONOCYTES NFR BLD AUTO: 8.7 % — SIGNIFICANT CHANGE UP (ref 1.7–9.3)
NEUTROPHILS # BLD AUTO: 3.03 K/UL — SIGNIFICANT CHANGE UP (ref 1.4–6.5)
NEUTROPHILS # BLD AUTO: 3.91 K/UL — SIGNIFICANT CHANGE UP (ref 1.4–6.5)
NEUTROPHILS NFR BLD AUTO: 74.8 % — SIGNIFICANT CHANGE UP (ref 42.2–75.2)
NEUTROPHILS NFR BLD AUTO: 79.5 % — HIGH (ref 42.2–75.2)
NITRITE UR-MCNC: NEGATIVE — SIGNIFICANT CHANGE UP
NRBC BLD AUTO-RTO: 0 /100 WBCS — SIGNIFICANT CHANGE UP (ref 0–0)
NRBC BLD AUTO-RTO: 0 /100 WBCS — SIGNIFICANT CHANGE UP (ref 0–0)
PH UR: 7 — SIGNIFICANT CHANGE UP (ref 5–8)
PLATELET # BLD AUTO: 62 K/UL — LOW (ref 130–400)
PLATELET # BLD AUTO: 66 K/UL — LOW (ref 130–400)
PLATELET # BLD AUTO: 79 K/UL — LOW (ref 130–400)
PMV BLD: 13.5 FL — HIGH (ref 7.4–10.4)
PMV BLD: SIGNIFICANT CHANGE UP (ref 7.4–10.4)
PMV BLD: SIGNIFICANT CHANGE UP (ref 7.4–10.4)
POTASSIUM SERPL-MCNC: 3.6 MMOL/L — SIGNIFICANT CHANGE UP (ref 3.5–5)
POTASSIUM SERPL-MCNC: 3.9 MMOL/L — SIGNIFICANT CHANGE UP (ref 3.5–5)
POTASSIUM SERPL-MCNC: 4.1 MMOL/L — SIGNIFICANT CHANGE UP (ref 3.5–5)
POTASSIUM SERPL-SCNC: 3.6 MMOL/L — SIGNIFICANT CHANGE UP (ref 3.5–5)
POTASSIUM SERPL-SCNC: 3.9 MMOL/L — SIGNIFICANT CHANGE UP (ref 3.5–5)
POTASSIUM SERPL-SCNC: 4.1 MMOL/L — SIGNIFICANT CHANGE UP (ref 3.5–5)
PROCALCITONIN SERPL-MCNC: 0.12 NG/ML — HIGH (ref 0.02–0.1)
PROT SERPL-MCNC: 6.5 G/DL — SIGNIFICANT CHANGE UP (ref 6–8)
PROT SERPL-MCNC: 6.7 G/DL — SIGNIFICANT CHANGE UP (ref 6–8)
PROT SERPL-MCNC: 6.8 G/DL — SIGNIFICANT CHANGE UP (ref 6–8)
PROT UR-MCNC: 30 MG/DL
PROTHROM AB SERPL-ACNC: 15.2 SEC — HIGH (ref 9.95–12.87)
RBC # BLD: 3.92 M/UL — LOW (ref 4.2–5.4)
RBC # BLD: 4 M/UL — LOW (ref 4.2–5.4)
RBC # BLD: 4.04 M/UL — LOW (ref 4.2–5.4)
RBC # FLD: 14.3 % — SIGNIFICANT CHANGE UP (ref 11.5–14.5)
RBC # FLD: 14.4 % — SIGNIFICANT CHANGE UP (ref 11.5–14.5)
RBC # FLD: 14.4 % — SIGNIFICANT CHANGE UP (ref 11.5–14.5)
RSV RNA NPH QL NAA+NON-PROBE: SIGNIFICANT CHANGE UP
SARS-COV-2 RNA SPEC QL NAA+PROBE: SIGNIFICANT CHANGE UP
SODIUM SERPL-SCNC: 127 MMOL/L — LOW (ref 135–146)
SODIUM SERPL-SCNC: 135 MMOL/L — SIGNIFICANT CHANGE UP (ref 135–146)
SODIUM SERPL-SCNC: 136 MMOL/L — SIGNIFICANT CHANGE UP (ref 135–146)
SOURCE RESPIRATORY: SIGNIFICANT CHANGE UP
SP GR SPEC: 1.01 — SIGNIFICANT CHANGE UP (ref 1–1.03)
TSH SERPL-MCNC: 0.17 UIU/ML — LOW (ref 0.27–4.2)
UROBILINOGEN FLD QL: 0.2 MG/DL — SIGNIFICANT CHANGE UP (ref 0.2–1)
WBC # BLD: 3.71 K/UL — LOW (ref 4.8–10.8)
WBC # BLD: 4.92 K/UL — SIGNIFICANT CHANGE UP (ref 4.8–10.8)
WBC # BLD: 5.31 K/UL — SIGNIFICANT CHANGE UP (ref 4.8–10.8)
WBC # FLD AUTO: 3.71 K/UL — LOW (ref 4.8–10.8)
WBC # FLD AUTO: 4.92 K/UL — SIGNIFICANT CHANGE UP (ref 4.8–10.8)
WBC # FLD AUTO: 5.31 K/UL — SIGNIFICANT CHANGE UP (ref 4.8–10.8)

## 2025-08-15 PROCEDURE — 86900 BLOOD TYPING SEROLOGIC ABO: CPT

## 2025-08-15 PROCEDURE — 86850 RBC ANTIBODY SCREEN: CPT

## 2025-08-15 PROCEDURE — 71045 X-RAY EXAM CHEST 1 VIEW: CPT | Mod: 26

## 2025-08-15 PROCEDURE — 80053 COMPREHEN METABOLIC PANEL: CPT

## 2025-08-15 PROCEDURE — 86753 PROTOZOA ANTIBODY NOS: CPT

## 2025-08-15 PROCEDURE — 93010 ELECTROCARDIOGRAM REPORT: CPT

## 2025-08-15 PROCEDURE — 94640 AIRWAY INHALATION TREATMENT: CPT

## 2025-08-15 PROCEDURE — 87468 ANAPLSMA PHGCYTOPHLM AMP PRB: CPT

## 2025-08-15 PROCEDURE — 87484 EHRLICHA CHAFFEENSIS AMP PRB: CPT

## 2025-08-15 PROCEDURE — 86618 LYME DISEASE ANTIBODY: CPT

## 2025-08-15 PROCEDURE — 86901 BLOOD TYPING SEROLOGIC RH(D): CPT

## 2025-08-15 PROCEDURE — 76705 ECHO EXAM OF ABDOMEN: CPT | Mod: 26

## 2025-08-15 PROCEDURE — 85027 COMPLETE CBC AUTOMATED: CPT

## 2025-08-15 PROCEDURE — 80074 ACUTE HEPATITIS PANEL: CPT

## 2025-08-15 PROCEDURE — 83735 ASSAY OF MAGNESIUM: CPT

## 2025-08-15 PROCEDURE — 87389 HIV-1 AG W/HIV-1&-2 AB AG IA: CPT

## 2025-08-15 PROCEDURE — 87798 DETECT AGENT NOS DNA AMP: CPT

## 2025-08-15 PROCEDURE — 87207 SMEAR SPECIAL STAIN: CPT

## 2025-08-15 PROCEDURE — 86666 EHRLICHIA ANTIBODY: CPT

## 2025-08-15 PROCEDURE — 85025 COMPLETE CBC W/AUTO DIFF WBC: CPT

## 2025-08-15 PROCEDURE — 99223 1ST HOSP IP/OBS HIGH 75: CPT

## 2025-08-15 PROCEDURE — 86665 EPSTEIN-BARR CAPSID VCA: CPT

## 2025-08-15 PROCEDURE — 87799 DETECT AGENT NOS DNA QUANT: CPT

## 2025-08-15 PROCEDURE — 99291 CRITICAL CARE FIRST HOUR: CPT

## 2025-08-15 PROCEDURE — 70450 CT HEAD/BRAIN W/O DYE: CPT | Mod: 26

## 2025-08-15 PROCEDURE — 86664 EPSTEIN-BARR NUCLEAR ANTIGEN: CPT

## 2025-08-15 PROCEDURE — 86663 EPSTEIN-BARR ANTIBODY: CPT

## 2025-08-15 PROCEDURE — 84443 ASSAY THYROID STIM HORMONE: CPT

## 2025-08-15 PROCEDURE — 36415 COLL VENOUS BLD VENIPUNCTURE: CPT

## 2025-08-15 PROCEDURE — 84145 PROCALCITONIN (PCT): CPT

## 2025-08-15 PROCEDURE — 82533 TOTAL CORTISOL: CPT

## 2025-08-15 RX ORDER — CEFEPIME 2 G/20ML
2000 INJECTION, POWDER, FOR SOLUTION INTRAVENOUS ONCE
Refills: 0 | Status: COMPLETED | OUTPATIENT
Start: 2025-08-15 | End: 2025-08-15

## 2025-08-15 RX ORDER — METOPROLOL SUCCINATE 50 MG/1
12.5 TABLET, EXTENDED RELEASE ORAL AT BEDTIME
Refills: 0 | Status: DISCONTINUED | OUTPATIENT
Start: 2025-08-15 | End: 2025-08-15

## 2025-08-15 RX ORDER — ACETAMINOPHEN 500 MG/5ML
975 LIQUID (ML) ORAL ONCE
Refills: 0 | Status: COMPLETED | OUTPATIENT
Start: 2025-08-15 | End: 2025-08-15

## 2025-08-15 RX ORDER — DOXYCYCLINE HYCLATE 100 MG
TABLET ORAL
Refills: 0 | Status: DISCONTINUED | OUTPATIENT
Start: 2025-08-15 | End: 2025-08-18

## 2025-08-15 RX ORDER — METOPROLOL SUCCINATE 50 MG/1
0.5 TABLET, EXTENDED RELEASE ORAL
Refills: 0 | DISCHARGE

## 2025-08-15 RX ORDER — VANCOMYCIN HCL IN 5 % DEXTROSE 1.5G/250ML
1000 PLASTIC BAG, INJECTION (ML) INTRAVENOUS ONCE
Refills: 0 | Status: COMPLETED | OUTPATIENT
Start: 2025-08-15 | End: 2025-08-15

## 2025-08-15 RX ORDER — METOPROLOL SUCCINATE 50 MG/1
1 TABLET, EXTENDED RELEASE ORAL
Refills: 0 | DISCHARGE

## 2025-08-15 RX ORDER — LEVOTHYROXINE SODIUM 300 MCG
1 TABLET ORAL
Refills: 0 | DISCHARGE

## 2025-08-15 RX ORDER — SODIUM CHLORIDE 9 G/1000ML
2400 INJECTION, SOLUTION INTRAVENOUS ONCE
Refills: 0 | Status: COMPLETED | OUTPATIENT
Start: 2025-08-15 | End: 2025-08-15

## 2025-08-15 RX ORDER — DOXYCYCLINE HYCLATE 100 MG
100 TABLET ORAL ONCE
Refills: 0 | Status: COMPLETED | OUTPATIENT
Start: 2025-08-15 | End: 2025-08-15

## 2025-08-15 RX ORDER — LEVOTHYROXINE SODIUM 300 MCG
100 TABLET ORAL DAILY
Refills: 0 | Status: DISCONTINUED | OUTPATIENT
Start: 2025-08-15 | End: 2025-08-18

## 2025-08-15 RX ORDER — DOXYCYCLINE HYCLATE 100 MG
100 TABLET ORAL EVERY 12 HOURS
Refills: 0 | Status: DISCONTINUED | OUTPATIENT
Start: 2025-08-16 | End: 2025-08-18

## 2025-08-15 RX ADMIN — Medication 975 MILLIGRAM(S): at 09:21

## 2025-08-15 RX ADMIN — Medication 250 MILLIGRAM(S): at 14:25

## 2025-08-15 RX ADMIN — SODIUM CHLORIDE 2400 MILLILITER(S): 9 INJECTION, SOLUTION INTRAVENOUS at 09:21

## 2025-08-15 RX ADMIN — CEFEPIME 100 MILLIGRAM(S): 2 INJECTION, POWDER, FOR SOLUTION INTRAVENOUS at 09:21

## 2025-08-15 RX ADMIN — Medication 100 MILLIGRAM(S): at 17:02

## 2025-08-16 LAB
ALBUMIN SERPL ELPH-MCNC: 3.4 G/DL — LOW (ref 3.5–5.2)
ALP SERPL-CCNC: 232 U/L — HIGH (ref 30–115)
ALT FLD-CCNC: 100 U/L — HIGH (ref 0–41)
ANION GAP SERPL CALC-SCNC: 12 MMOL/L — SIGNIFICANT CHANGE UP (ref 7–14)
AST SERPL-CCNC: 67 U/L — HIGH (ref 0–41)
B MICROTI DNA BLD QL NAA+PROBE: SIGNIFICANT CHANGE UP
BABESIA 18S RRNA BLD QL NAA+PROBE: SIGNIFICANT CHANGE UP
BASOPHILS # BLD AUTO: 0.01 K/UL — SIGNIFICANT CHANGE UP (ref 0–0.2)
BASOPHILS NFR BLD AUTO: 0.3 % — SIGNIFICANT CHANGE UP (ref 0–1)
BILIRUB SERPL-MCNC: 0.3 MG/DL — SIGNIFICANT CHANGE UP (ref 0.2–1.2)
BUN SERPL-MCNC: 5 MG/DL — LOW (ref 10–20)
CALCIUM SERPL-MCNC: 8.6 MG/DL — SIGNIFICANT CHANGE UP (ref 8.4–10.5)
CHLORIDE SERPL-SCNC: 101 MMOL/L — SIGNIFICANT CHANGE UP (ref 98–110)
CO2 SERPL-SCNC: 23 MMOL/L — SIGNIFICANT CHANGE UP (ref 17–32)
CORTIS AM PEAK SERPL-MCNC: 18.1 UG/DL — SIGNIFICANT CHANGE UP (ref 6–18.4)
CREAT SERPL-MCNC: 0.6 MG/DL — LOW (ref 0.7–1.5)
EBV EA AB SER IA-ACNC: <5 U/ML — SIGNIFICANT CHANGE UP
EBV EA AB TITR SER IF: POSITIVE
EBV EA IGG SER-ACNC: NEGATIVE — SIGNIFICANT CHANGE UP
EBV NA IGG SER IA-ACNC: >600 U/ML — HIGH
EBV PATRN SPEC IB-IMP: SIGNIFICANT CHANGE UP
EBV VCA IGG AVIDITY SER QL IA: POSITIVE
EBV VCA IGM SER IA-ACNC: <10 U/ML — SIGNIFICANT CHANGE UP
EBV VCA IGM SER IA-ACNC: >750 U/ML — HIGH
EBV VCA IGM TITR FLD: NEGATIVE — SIGNIFICANT CHANGE UP
EGFR: 110 ML/MIN/1.73M2 — SIGNIFICANT CHANGE UP
EGFR: 110 ML/MIN/1.73M2 — SIGNIFICANT CHANGE UP
EOSINOPHIL # BLD AUTO: 0.07 K/UL — SIGNIFICANT CHANGE UP (ref 0–0.7)
EOSINOPHIL NFR BLD AUTO: 1.8 % — SIGNIFICANT CHANGE UP (ref 0–8)
GLUCOSE SERPL-MCNC: 133 MG/DL — HIGH (ref 70–99)
HAV IGM SER-ACNC: SIGNIFICANT CHANGE UP
HBV CORE IGM SER-ACNC: SIGNIFICANT CHANGE UP
HBV SURFACE AG SER-ACNC: SIGNIFICANT CHANGE UP
HCT VFR BLD CALC: 31.4 % — LOW (ref 37–47)
HCV AB S/CO SERPL IA: 0.22 S/CO — SIGNIFICANT CHANGE UP (ref 0–0.79)
HCV AB SERPL-IMP: SIGNIFICANT CHANGE UP
HGB BLD-MCNC: 10.1 G/DL — LOW (ref 12–16)
HIV 1+2 AB+HIV1 P24 AG SERPL QL IA: SIGNIFICANT CHANGE UP
IMM GRANULOCYTES NFR BLD AUTO: 0.5 % — HIGH (ref 0.1–0.3)
LYMPHOCYTES # BLD AUTO: 1.25 K/UL — SIGNIFICANT CHANGE UP (ref 1.2–3.4)
LYMPHOCYTES # BLD AUTO: 32.6 % — SIGNIFICANT CHANGE UP (ref 20.5–51.1)
MAGNESIUM SERPL-MCNC: 1.8 MG/DL — SIGNIFICANT CHANGE UP (ref 1.8–2.4)
MCHC RBC-ENTMCNC: 26 PG — LOW (ref 27–31)
MCHC RBC-ENTMCNC: 32.2 G/DL — SIGNIFICANT CHANGE UP (ref 32–37)
MCV RBC AUTO: 80.9 FL — LOW (ref 81–99)
MONOCYTES # BLD AUTO: 0.25 K/UL — SIGNIFICANT CHANGE UP (ref 0.1–0.6)
MONOCYTES NFR BLD AUTO: 6.5 % — SIGNIFICANT CHANGE UP (ref 1.7–9.3)
NEUTROPHILS # BLD AUTO: 2.24 K/UL — SIGNIFICANT CHANGE UP (ref 1.4–6.5)
NEUTROPHILS NFR BLD AUTO: 58.3 % — SIGNIFICANT CHANGE UP (ref 42.2–75.2)
NRBC BLD AUTO-RTO: 0 /100 WBCS — SIGNIFICANT CHANGE UP (ref 0–0)
PARASITE BLOOD SMEAR RESULT: SIGNIFICANT CHANGE UP
PLATELET # BLD AUTO: 79 K/UL — LOW (ref 130–400)
PMV BLD: 13.4 FL — HIGH (ref 7.4–10.4)
POTASSIUM SERPL-MCNC: 3.9 MMOL/L — SIGNIFICANT CHANGE UP (ref 3.5–5)
POTASSIUM SERPL-SCNC: 3.9 MMOL/L — SIGNIFICANT CHANGE UP (ref 3.5–5)
PROT SERPL-MCNC: 6.6 G/DL — SIGNIFICANT CHANGE UP (ref 6–8)
RBC # BLD: 3.88 M/UL — LOW (ref 4.2–5.4)
RBC # FLD: 14.7 % — HIGH (ref 11.5–14.5)
SODIUM SERPL-SCNC: 136 MMOL/L — SIGNIFICANT CHANGE UP (ref 135–146)
WBC # BLD: 3.84 K/UL — LOW (ref 4.8–10.8)
WBC # FLD AUTO: 3.84 K/UL — LOW (ref 4.8–10.8)

## 2025-08-16 PROCEDURE — 99233 SBSQ HOSP IP/OBS HIGH 50: CPT

## 2025-08-16 RX ORDER — ACETAMINOPHEN 500 MG/5ML
650 LIQUID (ML) ORAL EVERY 6 HOURS
Refills: 0 | Status: COMPLETED | OUTPATIENT
Start: 2025-08-16 | End: 2026-07-15

## 2025-08-16 RX ORDER — ACETAMINOPHEN 500 MG/5ML
650 LIQUID (ML) ORAL EVERY 6 HOURS
Refills: 0 | Status: DISCONTINUED | OUTPATIENT
Start: 2025-08-16 | End: 2025-08-18

## 2025-08-16 RX ADMIN — Medication 1 DOSE(S): at 08:57

## 2025-08-16 RX ADMIN — Medication 100 MILLIGRAM(S): at 05:12

## 2025-08-16 RX ADMIN — Medication 100 MICROGRAM(S): at 05:12

## 2025-08-16 RX ADMIN — Medication 100 MILLIGRAM(S): at 17:37

## 2025-08-16 RX ADMIN — Medication 650 MILLIGRAM(S): at 02:00

## 2025-08-16 RX ADMIN — Medication 650 MILLIGRAM(S): at 16:30

## 2025-08-16 RX ADMIN — Medication 650 MILLIGRAM(S): at 18:29

## 2025-08-16 RX ADMIN — Medication 650 MILLIGRAM(S): at 01:32

## 2025-08-16 RX ADMIN — Medication 650 MILLIGRAM(S): at 08:56

## 2025-08-17 LAB
ALBUMIN SERPL ELPH-MCNC: 3.3 G/DL — LOW (ref 3.5–5.2)
ALP SERPL-CCNC: 213 U/L — HIGH (ref 30–115)
ALT FLD-CCNC: 75 U/L — HIGH (ref 0–41)
ANION GAP SERPL CALC-SCNC: 12 MMOL/L — SIGNIFICANT CHANGE UP (ref 7–14)
AST SERPL-CCNC: 37 U/L — SIGNIFICANT CHANGE UP (ref 0–41)
BASOPHILS # BLD AUTO: 0.02 K/UL — SIGNIFICANT CHANGE UP (ref 0–0.2)
BASOPHILS NFR BLD AUTO: 0.5 % — SIGNIFICANT CHANGE UP (ref 0–1)
BILIRUB SERPL-MCNC: <0.2 MG/DL — SIGNIFICANT CHANGE UP (ref 0.2–1.2)
BUN SERPL-MCNC: 5 MG/DL — LOW (ref 10–20)
CALCIUM SERPL-MCNC: 8.3 MG/DL — LOW (ref 8.4–10.5)
CHLORIDE SERPL-SCNC: 101 MMOL/L — SIGNIFICANT CHANGE UP (ref 98–110)
CO2 SERPL-SCNC: 22 MMOL/L — SIGNIFICANT CHANGE UP (ref 17–32)
CREAT SERPL-MCNC: 0.6 MG/DL — LOW (ref 0.7–1.5)
EGFR: 110 ML/MIN/1.73M2 — SIGNIFICANT CHANGE UP
EGFR: 110 ML/MIN/1.73M2 — SIGNIFICANT CHANGE UP
EOSINOPHIL # BLD AUTO: 0.3 K/UL — SIGNIFICANT CHANGE UP (ref 0–0.7)
EOSINOPHIL NFR BLD AUTO: 6.8 % — SIGNIFICANT CHANGE UP (ref 0–8)
GLUCOSE SERPL-MCNC: 104 MG/DL — HIGH (ref 70–99)
HCT VFR BLD CALC: 30 % — LOW (ref 37–47)
HGB BLD-MCNC: 9.7 G/DL — LOW (ref 12–16)
IMM GRANULOCYTES NFR BLD AUTO: 0.2 % — SIGNIFICANT CHANGE UP (ref 0.1–0.3)
LYMPHOCYTES # BLD AUTO: 1.46 K/UL — SIGNIFICANT CHANGE UP (ref 1.2–3.4)
LYMPHOCYTES # BLD AUTO: 33.2 % — SIGNIFICANT CHANGE UP (ref 20.5–51.1)
MAGNESIUM SERPL-MCNC: 1.8 MG/DL — SIGNIFICANT CHANGE UP (ref 1.8–2.4)
MCHC RBC-ENTMCNC: 26.1 PG — LOW (ref 27–31)
MCHC RBC-ENTMCNC: 32.3 G/DL — SIGNIFICANT CHANGE UP (ref 32–37)
MCV RBC AUTO: 80.6 FL — LOW (ref 81–99)
MONOCYTES # BLD AUTO: 0.39 K/UL — SIGNIFICANT CHANGE UP (ref 0.1–0.6)
MONOCYTES NFR BLD AUTO: 8.9 % — SIGNIFICANT CHANGE UP (ref 1.7–9.3)
NEUTROPHILS # BLD AUTO: 2.22 K/UL — SIGNIFICANT CHANGE UP (ref 1.4–6.5)
NEUTROPHILS NFR BLD AUTO: 50.4 % — SIGNIFICANT CHANGE UP (ref 42.2–75.2)
NRBC BLD AUTO-RTO: 0 /100 WBCS — SIGNIFICANT CHANGE UP (ref 0–0)
PARASITE BLOOD SMEAR RESULT: SIGNIFICANT CHANGE UP
PLATELET # BLD AUTO: 113 K/UL — LOW (ref 130–400)
PMV BLD: 12.7 FL — HIGH (ref 7.4–10.4)
POTASSIUM SERPL-MCNC: 3.6 MMOL/L — SIGNIFICANT CHANGE UP (ref 3.5–5)
POTASSIUM SERPL-SCNC: 3.6 MMOL/L — SIGNIFICANT CHANGE UP (ref 3.5–5)
PROT SERPL-MCNC: 6.3 G/DL — SIGNIFICANT CHANGE UP (ref 6–8)
RBC # BLD: 3.72 M/UL — LOW (ref 4.2–5.4)
RBC # FLD: 14.9 % — HIGH (ref 11.5–14.5)
SODIUM SERPL-SCNC: 135 MMOL/L — SIGNIFICANT CHANGE UP (ref 135–146)
WBC # BLD: 4.4 K/UL — LOW (ref 4.8–10.8)
WBC # FLD AUTO: 4.4 K/UL — LOW (ref 4.8–10.8)

## 2025-08-17 PROCEDURE — 99233 SBSQ HOSP IP/OBS HIGH 50: CPT

## 2025-08-17 RX ADMIN — Medication 1 DOSE(S): at 19:48

## 2025-08-17 RX ADMIN — Medication 650 MILLIGRAM(S): at 18:47

## 2025-08-17 RX ADMIN — Medication 100 MILLIGRAM(S): at 17:30

## 2025-08-17 RX ADMIN — Medication 650 MILLIGRAM(S): at 17:23

## 2025-08-17 RX ADMIN — Medication 1 DOSE(S): at 05:11

## 2025-08-17 RX ADMIN — Medication 100 MILLIGRAM(S): at 05:15

## 2025-08-17 RX ADMIN — Medication 650 MILLIGRAM(S): at 06:17

## 2025-08-17 RX ADMIN — Medication 650 MILLIGRAM(S): at 12:15

## 2025-08-17 RX ADMIN — Medication 650 MILLIGRAM(S): at 07:06

## 2025-08-17 RX ADMIN — Medication 100 MICROGRAM(S): at 05:15

## 2025-08-17 RX ADMIN — Medication 1 DOSE(S): at 12:15

## 2025-08-18 ENCOUNTER — TRANSCRIPTION ENCOUNTER (OUTPATIENT)
Age: 50
End: 2025-08-18

## 2025-08-18 VITALS
SYSTOLIC BLOOD PRESSURE: 146 MMHG | TEMPERATURE: 99 F | OXYGEN SATURATION: 99 % | DIASTOLIC BLOOD PRESSURE: 80 MMHG | HEART RATE: 95 BPM | RESPIRATION RATE: 18 BRPM

## 2025-08-18 LAB
A PHAGOCYTOPH DNA BLD QL NAA+PROBE: NEGATIVE — SIGNIFICANT CHANGE UP
ALBUMIN SERPL ELPH-MCNC: 3.5 G/DL — SIGNIFICANT CHANGE UP (ref 3.5–5.2)
ALP SERPL-CCNC: 197 U/L — HIGH (ref 30–115)
ALT FLD-CCNC: 65 U/L — HIGH (ref 0–41)
ANION GAP SERPL CALC-SCNC: 12 MMOL/L — SIGNIFICANT CHANGE UP (ref 7–14)
AST SERPL-CCNC: 35 U/L — SIGNIFICANT CHANGE UP (ref 0–41)
BASOPHILS # BLD AUTO: 0.04 K/UL — SIGNIFICANT CHANGE UP (ref 0–0.2)
BASOPHILS NFR BLD AUTO: 0.7 % — SIGNIFICANT CHANGE UP (ref 0–1)
BILIRUB SERPL-MCNC: <0.2 MG/DL — SIGNIFICANT CHANGE UP (ref 0.2–1.2)
BUN SERPL-MCNC: 6 MG/DL — LOW (ref 10–20)
CALCIUM SERPL-MCNC: 8.6 MG/DL — SIGNIFICANT CHANGE UP (ref 8.4–10.5)
CHLORIDE SERPL-SCNC: 103 MMOL/L — SIGNIFICANT CHANGE UP (ref 98–110)
CMV DNA CSF QL NAA+PROBE: SIGNIFICANT CHANGE UP IU/ML
CMV DNA SPEC NAA+PROBE-LOG#: SIGNIFICANT CHANGE UP LOG10IU/ML
CO2 SERPL-SCNC: 23 MMOL/L — SIGNIFICANT CHANGE UP (ref 17–32)
CREAT SERPL-MCNC: 0.6 MG/DL — LOW (ref 0.7–1.5)
E CHAFFEENSIS DNA BLD QL NAA+PROBE: NEGATIVE — SIGNIFICANT CHANGE UP
E EWINGII DNA SPEC QL NAA+PROBE: NEGATIVE — SIGNIFICANT CHANGE UP
EBV DNA SERPL NAA+PROBE-ACNC: SIGNIFICANT CHANGE UP IU/ML
EBVPCR LOG: SIGNIFICANT CHANGE UP LOG10IU/ML
EGFR: 110 ML/MIN/1.73M2 — SIGNIFICANT CHANGE UP
EGFR: 110 ML/MIN/1.73M2 — SIGNIFICANT CHANGE UP
EHRLICHIA DNA SPEC QL NAA+PROBE: NEGATIVE — SIGNIFICANT CHANGE UP
EOSINOPHIL # BLD AUTO: 0.29 K/UL — SIGNIFICANT CHANGE UP (ref 0–0.7)
EOSINOPHIL NFR BLD AUTO: 5.1 % — SIGNIFICANT CHANGE UP (ref 0–8)
GLUCOSE SERPL-MCNC: 94 MG/DL — SIGNIFICANT CHANGE UP (ref 70–99)
HCT VFR BLD CALC: 30.8 % — LOW (ref 37–47)
HGB BLD-MCNC: 10 G/DL — LOW (ref 12–16)
IMM GRANULOCYTES NFR BLD AUTO: 0.5 % — HIGH (ref 0.1–0.3)
LYMPHOCYTES # BLD AUTO: 1.8 K/UL — SIGNIFICANT CHANGE UP (ref 1.2–3.4)
LYMPHOCYTES # BLD AUTO: 31.7 % — SIGNIFICANT CHANGE UP (ref 20.5–51.1)
MAGNESIUM SERPL-MCNC: 1.7 MG/DL — LOW (ref 1.8–2.4)
MCHC RBC-ENTMCNC: 26.2 PG — LOW (ref 27–31)
MCHC RBC-ENTMCNC: 32.5 G/DL — SIGNIFICANT CHANGE UP (ref 32–37)
MCV RBC AUTO: 80.6 FL — LOW (ref 81–99)
MONOCYTES # BLD AUTO: 0.35 K/UL — SIGNIFICANT CHANGE UP (ref 0.1–0.6)
MONOCYTES NFR BLD AUTO: 6.2 % — SIGNIFICANT CHANGE UP (ref 1.7–9.3)
NEUTROPHILS # BLD AUTO: 3.17 K/UL — SIGNIFICANT CHANGE UP (ref 1.4–6.5)
NEUTROPHILS NFR BLD AUTO: 55.8 % — SIGNIFICANT CHANGE UP (ref 42.2–75.2)
NRBC BLD AUTO-RTO: 0 /100 WBCS — SIGNIFICANT CHANGE UP (ref 0–0)
PLATELET # BLD AUTO: 176 K/UL — SIGNIFICANT CHANGE UP (ref 130–400)
PMV BLD: 12.5 FL — HIGH (ref 7.4–10.4)
POTASSIUM SERPL-MCNC: 4.4 MMOL/L — SIGNIFICANT CHANGE UP (ref 3.5–5)
POTASSIUM SERPL-SCNC: 4.4 MMOL/L — SIGNIFICANT CHANGE UP (ref 3.5–5)
PROT SERPL-MCNC: 6.2 G/DL — SIGNIFICANT CHANGE UP (ref 6–8)
RBC # BLD: 3.82 M/UL — LOW (ref 4.2–5.4)
RBC # FLD: 15.1 % — HIGH (ref 11.5–14.5)
SODIUM SERPL-SCNC: 138 MMOL/L — SIGNIFICANT CHANGE UP (ref 135–146)
WBC # BLD: 5.68 K/UL — SIGNIFICANT CHANGE UP (ref 4.8–10.8)
WBC # FLD AUTO: 5.68 K/UL — SIGNIFICANT CHANGE UP (ref 4.8–10.8)

## 2025-08-18 PROCEDURE — 99239 HOSP IP/OBS DSCHRG MGMT >30: CPT

## 2025-08-18 RX ORDER — DOXYCYCLINE HYCLATE 100 MG
100 TABLET ORAL EVERY 12 HOURS
Refills: 0 | Status: DISCONTINUED | OUTPATIENT
Start: 2025-08-18 | End: 2025-08-18

## 2025-08-18 RX ORDER — IBUPROFEN 200 MG
600 TABLET ORAL ONCE
Refills: 0 | Status: COMPLETED | OUTPATIENT
Start: 2025-08-18 | End: 2025-08-18

## 2025-08-18 RX ORDER — DOXYCYCLINE HYCLATE 100 MG
2 TABLET ORAL
Qty: 52 | Refills: 0
Start: 2025-08-18 | End: 2025-08-30

## 2025-08-18 RX ORDER — ACETAMINOPHEN 500 MG/5ML
975 LIQUID (ML) ORAL ONCE
Refills: 0 | Status: COMPLETED | OUTPATIENT
Start: 2025-08-18 | End: 2025-08-18

## 2025-08-18 RX ORDER — ONDANSETRON HCL/PF 4 MG/2 ML
4 VIAL (ML) INJECTION ONCE
Refills: 0 | Status: COMPLETED | OUTPATIENT
Start: 2025-08-18 | End: 2025-08-18

## 2025-08-18 RX ADMIN — Medication 650 MILLIGRAM(S): at 05:29

## 2025-08-18 RX ADMIN — Medication 975 MILLIGRAM(S): at 14:47

## 2025-08-18 RX ADMIN — Medication 650 MILLIGRAM(S): at 06:11

## 2025-08-18 RX ADMIN — Medication 100 MILLIGRAM(S): at 05:29

## 2025-08-18 RX ADMIN — Medication 100 MICROGRAM(S): at 05:09

## 2025-08-18 RX ADMIN — Medication 600 MILLIGRAM(S): at 11:17

## 2025-08-18 RX ADMIN — Medication 1 DOSE(S): at 08:02

## 2025-08-18 RX ADMIN — Medication 600 MILLIGRAM(S): at 10:31

## 2025-08-20 LAB
A PHAGOCYTOPH IGG TITR SER IF: NEGATIVE — SIGNIFICANT CHANGE UP
A PHAGOCYTOPH IGM TITR SER IF: NEGATIVE — SIGNIFICANT CHANGE UP
CULTURE RESULTS: SIGNIFICANT CHANGE UP
CULTURE RESULTS: SIGNIFICANT CHANGE UP
E CHAFFEENSIS IGG TITR SER: NEGATIVE — SIGNIFICANT CHANGE UP
E CHAFFEENSIS IGM TITR SER IF: NEGATIVE — SIGNIFICANT CHANGE UP
ERHLICIA RESULT COMMENT: SIGNIFICANT CHANGE UP
SPECIMEN SOURCE: SIGNIFICANT CHANGE UP
SPECIMEN SOURCE: SIGNIFICANT CHANGE UP

## 2025-08-21 ENCOUNTER — RX RENEWAL (OUTPATIENT)
Age: 50
End: 2025-08-21

## 2025-08-21 LAB
B MICROTI IGG TITR SER: SIGNIFICANT CHANGE UP
B MICROTI IGM TITR SER: SIGNIFICANT CHANGE UP

## 2025-08-23 LAB
A PHAGOCYTOPH IGG TITR SER IF: SIGNIFICANT CHANGE UP
B BURGDOR AB SER QL IA: 0.56 IV — SIGNIFICANT CHANGE UP
B MICROTI IGG TITR SER: SIGNIFICANT CHANGE UP
E CHAFFEENSIS IGG TITR SER IF: ABNORMAL

## 2025-08-24 DIAGNOSIS — E22.2 SYNDROME OF INAPPROPRIATE SECRETION OF ANTIDIURETIC HORMONE: ICD-10-CM

## 2025-08-24 DIAGNOSIS — D61.818 OTHER PANCYTOPENIA: ICD-10-CM

## 2025-08-24 DIAGNOSIS — E03.9 HYPOTHYROIDISM, UNSPECIFIED: ICD-10-CM

## 2025-08-24 DIAGNOSIS — G90.50 COMPLEX REGIONAL PAIN SYNDROME I, UNSPECIFIED: ICD-10-CM

## 2025-08-24 DIAGNOSIS — Z79.890 HORMONE REPLACEMENT THERAPY: ICD-10-CM

## 2025-08-24 DIAGNOSIS — Z91.018 ALLERGY TO OTHER FOODS: ICD-10-CM

## 2025-08-24 DIAGNOSIS — Z79.51 LONG TERM (CURRENT) USE OF INHALED STEROIDS: ICD-10-CM

## 2025-08-24 DIAGNOSIS — R00.0 TACHYCARDIA, UNSPECIFIED: ICD-10-CM

## 2025-08-24 DIAGNOSIS — A41.9 SEPSIS, UNSPECIFIED ORGANISM: ICD-10-CM

## 2025-08-24 DIAGNOSIS — J45.909 UNSPECIFIED ASTHMA, UNCOMPLICATED: ICD-10-CM

## 2025-08-24 DIAGNOSIS — D18.03 HEMANGIOMA OF INTRA-ABDOMINAL STRUCTURES: ICD-10-CM
